# Patient Record
Sex: MALE | Race: WHITE | NOT HISPANIC OR LATINO | Employment: OTHER
[De-identification: names, ages, dates, MRNs, and addresses within clinical notes are randomized per-mention and may not be internally consistent; named-entity substitution may affect disease eponyms.]

---

## 2022-10-28 ENCOUNTER — TRANSCRIBE ORDERS (OUTPATIENT)
Dept: OTHER | Age: 62
End: 2022-10-28

## 2022-10-28 DIAGNOSIS — C61 PROSTATE CANCER (H): Primary | ICD-10-CM

## 2022-10-31 ENCOUNTER — PATIENT OUTREACH (OUTPATIENT)
Dept: ONCOLOGY | Facility: CLINIC | Age: 62
End: 2022-10-31

## 2022-10-31 NOTE — PROGRESS NOTES
New Patient Oncology Nurse Navigator Note     Referring provider:   Dr. Kobe Ontiveros at      Referred to (specialty): Medical Oncology    Date Referral Received: 10/28/22     Evaluation for : Second opinion Prostate Cancer      Clinical History (per Nurse review of records provided):   PAST MEDICAL HISTORY:   1) Prostate cancer  a. PSA 4.79 on 5/15/2013 having been normal at 1.89 6/15/2007  b. Subsequent increasing PSA with management deferred due to diagnosis of metastatic esophageal cancer.  c. PSA 12.92 on 6/2/2017  d. Prostate biopsy 5/1/2018 demonstrated adenocarcinoma Lind grade 4+4 (grade group 4) multiple subsequent discussions about treatment options occurred. Until ultimately proceeded to prostatectomy 9/1/2022. PSA was 37.84 on 1/27/2022  e. Prostatectomy 9/1/2022 pathologic evaluation demonstrated prostatic adenocarcinoma Jules 4.4 (grade group 4) 4 lymph nodes were removed noninvolved pT3a, pN0.  f. PSA on 9/29/2022 was 13.21  g. PSMA PET 10/6/2022 demonstrated abnormal bilateral pelvic lymph node uptake compatible with metastatic disease and abnormal uptake in the prostatectomy bed concerning for residual recurrent disease but could be inflammatory given prior anastomotic leak and surgery.    2)  Metastatic esophageal cancer  a. Presented 8/23/2013 iron deficiency anemia, hepatic metastases, portal vein thrombosis, gastric cardia thickening.  b. Diagnosed EGD 8/25/2013 GE junction biopsy demonstrate invasive adenocarcinoma border grade 3-4 (of 4). HER2 3+ immunohistochemistry  c. Metastatic disease diagnosed liver core needle biopsy 8/28/2013 demonstrating poorly differentiated non-small cell carcinoma.  d. Treated 5 cycles docetaxel, cisplatin, 5-FU, trastuzumab between 9/10/2013 and 12/10/2013 with excellent response. Continued single agent trastuzumab from January 2014 until 9/17/2015.  e. Ongoing surveillance CT imaging without evidence of progression through 9/10/2020          Records Location (Care Everywhere, Media, etc.):   [facility]     Records Needed:   Per protocol    I called Davion to discuss referral to medical oncology.  I reviewed what to expect at this first visit.  He indicates all care has been with .  I let him know as soon as his records are in process of retrieval, I will push on his referral to our new patient scheduling team.  They will be reaching out to set up the appointment.  At his request, I will be emailing both the new patient scheduling and [facility] phone numbers, as he is not able to write anything down at this time.  He does not have any questions at this time.  I let him know to call us if he does.

## 2022-11-01 ENCOUNTER — PRE VISIT (OUTPATIENT)
Dept: ONCOLOGY | Facility: CLINIC | Age: 62
End: 2022-11-01

## 2022-11-01 NOTE — TELEPHONE ENCOUNTER
Action 2022 1:12 PM ABT   Action Taken Slides from [facility] received and taken to 5th floor path lab for review.     Imaging Received  2022 6:27 PM ABT   Action: Images from [facility] received and resolved to PACS.     RECORDS STATUS - ALL OTHER DIAGNOSIS      RECORDS RECEIVED FROM: [facility]   DATE RECEIVED: 22   NOTES STATUS DETAILS   OFFICE NOTE from referring provider  10/26/22: Dr. Kobe Ontiveros   OFFICE NOTE from medical oncologist  10/26/22: Dr. Kobe Ontiveros   DISCHARGE SUMMARY from hospital  22, 13, 13: [facility]   DISCHARGE REPORT from the ER  22, 22, 22: [place] [facility]   OPERATIVE REPORT  22: robotic assisted radical prostatectomy with bilateral pelvic lymphadenectomy   MEDICATION LIST     LABS     PATHOLOGY REPORTS Re/--[facility]  [employer] 22: [id number]  18: [id number]   ANYTHING RELATED TO DIAGNOSIS  Most recent 22   IMAGING (NEED IMAGES & REPORT)     CT SCANS PACS 22: CT Pelvis  22-13: CT Chest Abd Pel  14-13: CT Abd Pel  13: CT Needle Biopsy   MRI PACS 22-19: MR Prostate  17: MR Pelvis   XRAYS PACS 22: XR Cystogram  22, 22: XR Abd   ULTRASOUND PACS 13: US Needle Bx   PET PACS 10/06/22: PET CT Skull  22-05/15/18: NM Bone Scan

## 2022-11-07 ENCOUNTER — ONCOLOGY VISIT (OUTPATIENT)
Dept: ONCOLOGY | Facility: CLINIC | Age: 62
End: 2022-11-07
Attending: INTERNAL MEDICINE
Payer: COMMERCIAL

## 2022-11-07 VITALS
HEART RATE: 72 BPM | TEMPERATURE: 98 F | SYSTOLIC BLOOD PRESSURE: 135 MMHG | OXYGEN SATURATION: 97 % | WEIGHT: 207.1 LBS | BODY MASS INDEX: 30.67 KG/M2 | DIASTOLIC BLOOD PRESSURE: 78 MMHG | HEIGHT: 69 IN

## 2022-11-07 DIAGNOSIS — C61 PROSTATE CANCER (H): ICD-10-CM

## 2022-11-07 PROCEDURE — 99205 OFFICE O/P NEW HI 60 MIN: CPT | Performed by: INTERNAL MEDICINE

## 2022-11-07 PROCEDURE — G0463 HOSPITAL OUTPT CLINIC VISIT: HCPCS

## 2022-11-07 RX ORDER — SILDENAFIL CITRATE 20 MG/1
20 TABLET ORAL
COMMUNITY
Start: 2022-09-30

## 2022-11-07 ASSESSMENT — PAIN SCALES - GENERAL: PAINLEVEL: NO PAIN (0)

## 2022-11-07 NOTE — PATIENT INSTRUCTIONS
Today we discussed the extent of disease as based on the postoperative PSA as well as the Pylarify PSMA PET scan.  From my read of the scans this appears to be oligometastatic prostate cancer which is confined to the pelvic lymph nodes.  There is some artifact of a fluid collection near the base of the bladder that may or may not contain some disease.    Because of the [place] 8 and the elevated PSA immediately after surgery I believe we should consider androgen deprivation therapy plus a next generation hormonal agent such as Zytiga in addition to pelvic radiotherapy.    Because continence is not yet fully restored I am comfortable waiting a few more weeks until approximately mid December when it will be 3 months from the surgery.  At that time we can consider starting the ADT and Zytiga and aim to do the radiation therapy in early 2023-2 to 3 months after starting the ADT.    We also discussed that there is a new clinical trial starting that looks that PLUVICTO  in the hormone sensitive setting.  I am going to screen him for eligibility for that trial however I think that the extent of disease may not be substantial enough to merit going only with systemic therapy.  In other words, I think there is a chance we can cure this disease by doing the combination of external beam radiation therapy to the prostate bed and the lymph nodes as well as the systemic therapy.    We described the side effects of the hormonal therapy to include hot flashes loss of bone density loss of libido weight gain and potential metabolic risks such as diabetes.  Much of this can be prevented through lifestyle changes such as diet and exercise.    I additionally described the side effects attributable to Zytiga which include hypertension, hypokalemia as well as edema.  This is typically taken care of with low-dose prednisone to reduce the side effects.  Alternative therapy could include darolutamide in the setting as well.    I would plan on  1 to 2 years of treatment with systemic hormonal therapy.  As I discussed with them the goal of therapy here is still cure.  Assuming that the lymph node is the bulk of the tumor in the body and that the systemic therapy is able to reduce and eliminate any systemic disease that is not visible on the PSMA PET scans.

## 2022-11-07 NOTE — NURSING NOTE
"Oncology Rooming Note    November 7, 2022 11:00 AM   Davion Yang is a 62 year old male who presents for:    Chief Complaint   Patient presents with     New Patient     PROSTATE CANCER      Initial Vitals: /78 (BP Location: Right arm, Patient Position: Sitting, Cuff Size: Adult Regular)   Pulse 72   Temp 98  F (36.7  C) (Oral)   Ht 1.759 m (5' 9.25\")   Wt 93.9 kg (207 lb 1.6 oz)   SpO2 97%   BMI 30.36 kg/m   Estimated body mass index is 30.36 kg/m  as calculated from the following:    Height as of this encounter: 1.759 m (5' 9.25\").    Weight as of this encounter: 93.9 kg (207 lb 1.6 oz). Body surface area is 2.14 meters squared.  No Pain (0) Comment: Data Unavailable   No LMP for male patient.  Allergies reviewed: Yes  Medications reviewed: Yes    Medications: Medication refills not needed today.  Pharmacy name entered into EPIC: Data Unavailable    Clinical concerns: New patient        Kendra Nguyen CMA            "

## 2022-11-07 NOTE — LETTER
2022         RE: Davion Yang  [address] [id number]        Dear Colleague,    Thank you for referring your patient, Davion Yang, to the [facility]. Please see a copy of my visit note below.      [facility] Medical Oncology New Patient Consultation Note       Date of visit: 2022    Ml: new diagnosis oligo met CSPC post RP    HPI:   Patient is a 61 y/o M with a history of prostate cancer and metastatic esophageal adenocarcinoma (2013).    2018: Prostate biopsy demonstrated adenocarcinoma [place] 8 (4+4).   2022: Prostatomy, pathologic evaluation demonstrated prostatic adenocarcinoma Jules 8 (4+4). 4 lymph nodes were removed and noninvolved pT3a, pN0  10/6/22: PSMA PET which demonstrated increased uptake in the prostate bed and bilateral pelvic lymph nodes.     PSA  6/15/2007 PSA 1.89  5/15/2013 PSA 4.79  2017 PSA 12.92  2019 PSA 20.77  2019 PSA 34.8  2020 PSA 34.63  2022 PSA 37.84  2022 PSA 13.21    Interval History:   Patient presents with his wife for a second opinion. Patient reports feeling well today. Patient has been complaining of erectile dysfunction since his prostatectomy in September. He notes there was anastomotic leak following the procedure. He noticed increased pressure, difficulty urinating, and difficulty emptying his bladder at that time. He received traction and noticed considerable improvement in his symptoms. He continues to have incontinence, using 1-2 pads a day. Denies any fevers, chills, headaches, chest pain, dyspnea, abdominal pain, N/V/D, or extremity swelling    PMH:  Prostate Cancer  Esophageal carcinoma ()    MEDS  slidenafil 20 mg     Social:  Occupation: , part-time   Former smoker, quit   Denies alcohol use  Denies Illicit drug use    Family   Father: , unknown cancer (Possible metastatic prostate)     ROS  10-point review of systems performed.  All negative except for symptoms noted above in HPI.    PHYSICAL EXAM    General: Patient appears well. Normal body habitus  HEENT: Normocephalic, atraumatic.   Cardiac: Regular rate and rhythm. No murmurs, rub, or extra heart sounds  Lungs: Clear to auscultation bilaterally  Abdomen: Normal bowel sounds. No tenderness to palpation    LABS AND IMAGES    10/6/2022 - PSMA PET CT  IMPRESSION:   1.   Abnormal radiotracer uptake within bilateral pelvic lymph nodes is compatible with metastatic disease.   2.  Abnormal radiotracer uptake at the site of prostatectomy and prior fluid collection is indeterminate. This is concerning for residual or recurrent disease, but the differential would include inflammatory findings related to the prior anastomotic leak and associated fluid collection.     Pathology 9-1-22    A.  Lymph nodes, right pelvic, regional resection:  - Three (3) lymph nodes identified, all negative for metastatic carcinoma (0/3).     B.  Lymph nodes, left pelvic, regional resection:  - One (1) lymph node identified, negative for metastatic carcinoma (0/1).     C.  Tip of left seminal vesicle, biopsy:  - Seminal vesicle tissue with no significant pathologic change.  - No malignancy identified.     D.  Bilateral ejaculatory ducts, biopsy:  - Ejaculatory duct tissue with no significant pathologic change.  - No malignancy identified.     E.  Prostate, radical prostatectomy:  - Prostatic adenocarcinoma, acinar type, with extensive cribriform growth.  (See comment)  - Histologic grade: Jules score 4+4 = 8 (grade group 4).  - Extraprostatic extension identified (nonfocal); no seminal vesicle invasion identified.  - Margins negative.  - Lymph nodes (pelvic):  0/4 (see Parts A & B above).  - AJCC pathologic stage:  pT3a pN0.    IMPRESSION AND PLAN  #Prostate adenocarcinoma s/p prostatectomy   Today we discussed the extent of disease as based on the postoperative PSA as well as the Pylarify PSMA PET scan.  From my  read of the scans this appears to be oligometastatic prostate cancer which is confined to the pelvic lymph nodes.  There is some artifact of a fluid collection near the base of the bladder that may or may not contain some disease.    Because of the [place] 8 and the elevated PSA immediately after surgery I believe we should consider androgen deprivation therapy plus a next generation hormonal agent such as Zytiga in addition to pelvic radiotherapy.    Because continence is not yet fully restored I am comfortable waiting a few more weeks until approximately mid December when it will be 3 months from the surgery.  At that time we can consider starting the ADT and Zytiga and aim to do the radiation therapy in early 2023-2 to 3 months after starting the ADT.    We also discussed that there is a new clinical trial starting that looks that PLUVICTO  in the hormone sensitive setting.  I am going to screen him for eligibility for that trial however I think that the extent of disease may not be substantial enough to merit going only with systemic therapy.  In other words, I think there is a chance we can cure this disease by doing the combination of external beam radiation therapy to the prostate bed and the lymph nodes as well as the systemic therapy.    We described the side effects of the hormonal therapy to include hot flashes loss of bone density loss of libido weight gain and potential metabolic risks such as diabetes.  Much of this can be prevented through lifestyle changes such as diet and exercise.    I additionally described the side effects attributable to Zytiga which include hypertension, hypokalemia as well as edema.  This is typically taken care of with low-dose prednisone to reduce the side effects.  Alternative therapy could include darolutamide in the setting as well.    I would plan on 1 to 2 years of treatment with systemic hormonal therapy.  As I discussed with them the goal of therapy here is still  cure.  Assuming that the lymph node is the bulk of the tumor in the body and that the systemic therapy is able to reduce and eliminate any systemic disease that is not visible on the PSMA PET scans.      SIGNED    Shane Dias MD  Professor of Medicine

## 2022-11-07 NOTE — PROGRESS NOTES
[facility] Medical Oncology New Patient Consultation Note       Date of visit: 2022    Ml: new diagnosis oligo met CSPC post RP    HPI:   Patient is a 61 y/o M with a history of prostate cancer and metastatic esophageal adenocarcinoma (2013).    2018: Prostate biopsy demonstrated adenocarcinoma [place] 8 (4+4).   2022: Prostatomy, pathologic evaluation demonstrated prostatic adenocarcinoma [place] 8 (4+4). 4 lymph nodes were removed and noninvolved pT3a, pN0  10/6/22: PSMA PET which demonstrated increased uptake in the prostate bed and bilateral pelvic lymph nodes.     PSA  6/15/2007 PSA 1.89  5/15/2013 PSA 4.79  2017 PSA 12.92  2019 PSA 20.77  2019 PSA 34.8  2020 PSA 34.63  2022 PSA 37.84  2022 PSA 13.21    Interval History:   Patient presents with his wife for a second opinion. Patient reports feeling well today. Patient has been complaining of erectile dysfunction since his prostatectomy in September. He notes there was anastomotic leak following the procedure. He noticed increased pressure, difficulty urinating, and difficulty emptying his bladder at that time. He received traction and noticed considerable improvement in his symptoms. He continues to have incontinence, using 1-2 pads a day. Denies any fevers, chills, headaches, chest pain, dyspnea, abdominal pain, N/V/D, or extremity swelling    PMH:  Prostate Cancer  Esophageal carcinoma ()    MEDS  slidenafil 20 mg     Social:  Occupation: , part-time   Former smoker, quit   Denies alcohol use  Denies Illicit drug use    Family   Father: , unknown cancer (Possible metastatic prostate)     ROS  10-point review of systems performed. All negative except for symptoms noted above in HPI.    PHYSICAL EXAM    General: Patient appears well. Normal body habitus  HEENT: Normocephalic, atraumatic.   Cardiac: Regular rate and rhythm. No murmurs, rub, or extra heart sounds  Lungs: Clear to  auscultation bilaterally  Abdomen: Normal bowel sounds. No tenderness to palpation    LABS AND IMAGES    10/6/2022 - PSMA PET CT  IMPRESSION:   1.   Abnormal radiotracer uptake within bilateral pelvic lymph nodes is compatible with metastatic disease.   2.  Abnormal radiotracer uptake at the site of prostatectomy and prior fluid collection is indeterminate. This is concerning for residual or recurrent disease, but the differential would include inflammatory findings related to the prior anastomotic leak and associated fluid collection.     Pathology 9-1-22    A.  Lymph nodes, right pelvic, regional resection:  - Three (3) lymph nodes identified, all negative for metastatic carcinoma (0/3).     B.  Lymph nodes, left pelvic, regional resection:  - One (1) lymph node identified, negative for metastatic carcinoma (0/1).     C.  Tip of left seminal vesicle, biopsy:  - Seminal vesicle tissue with no significant pathologic change.  - No malignancy identified.     D.  Bilateral ejaculatory ducts, biopsy:  - Ejaculatory duct tissue with no significant pathologic change.  - No malignancy identified.     E.  Prostate, radical prostatectomy:  - Prostatic adenocarcinoma, acinar type, with extensive cribriform growth.  (See comment)  - Histologic grade: Jules score 4+4 = 8 (grade group 4).  - Extraprostatic extension identified (nonfocal); no seminal vesicle invasion identified.  - Margins negative.  - Lymph nodes (pelvic):  0/4 (see Parts A & B above).  - AJCC pathologic stage:  pT3a pN0.    IMPRESSION AND PLAN  #Prostate adenocarcinoma s/p prostatectomy   Today we discussed the extent of disease as based on the postoperative PSA as well as the Pylarify PSMA PET scan.  From my read of the scans this appears to be oligometastatic prostate cancer which is confined to the pelvic lymph nodes.  There is some artifact of a fluid collection near the base of the bladder that may or may not contain some disease.    Because of the  [place] 8 and the elevated PSA immediately after surgery I believe we should consider androgen deprivation therapy plus a next generation hormonal agent such as Zytiga in addition to pelvic radiotherapy.    Because continence is not yet fully restored I am comfortable waiting a few more weeks until approximately mid December when it will be 3 months from the surgery.  At that time we can consider starting the ADT and Zytiga and aim to do the radiation therapy in early 2023-2 to 3 months after starting the ADT.    We also discussed that there is a new clinical trial starting that looks that PLUVICTO  in the hormone sensitive setting.  I am going to screen him for eligibility for that trial however I think that the extent of disease may not be substantial enough to merit going only with systemic therapy.  In other words, I think there is a chance we can cure this disease by doing the combination of external beam radiation therapy to the prostate bed and the lymph nodes as well as the systemic therapy.    We described the side effects of the hormonal therapy to include hot flashes loss of bone density loss of libido weight gain and potential metabolic risks such as diabetes.  Much of this can be prevented through lifestyle changes such as diet and exercise.    I additionally described the side effects attributable to Zytiga which include hypertension, hypokalemia as well as edema.  This is typically taken care of with low-dose prednisone to reduce the side effects.  Alternative therapy could include darolutamide in the setting as well.    I would plan on 1 to 2 years of treatment with systemic hormonal therapy.  As I discussed with them the goal of therapy here is still cure.  Assuming that the lymph node is the bulk of the tumor in the body and that the systemic therapy is able to reduce and eliminate any systemic disease that is not visible on the PSMA PET scans.      SIGNED    Shane Dias MD  Professor of  Medicine

## 2022-11-09 ENCOUNTER — PATIENT OUTREACH (OUTPATIENT)
Dept: ONCOLOGY | Facility: CLINIC | Age: 62
End: 2022-11-09

## 2022-11-09 ENCOUNTER — LAB REQUISITION (OUTPATIENT)
Dept: LAB | Facility: CLINIC | Age: 62
End: 2022-11-09
Payer: COMMERCIAL

## 2022-11-09 PROCEDURE — 88321 CONSLTJ&REPRT SLD PREP ELSWR: CPT | Performed by: PATHOLOGY

## 2022-11-09 NOTE — PROGRESS NOTES
[facility]: Cancer Care Plan of Care Education Note                                    Discussion with Patient:                                                      TC to pt to follow-up on visit with Dr. Dias on 11/07/2022    Assessment:                                                      Assessment completed with:: Patient;Spouse or significant other    Current living arrangement  I live in a private home with spouse    Plan of Care Education   Does patient understand diagnosis?: Yes  Does patient understand treatment plan/regimen?: Yes    Evaluation of Learning  Readiness:: Acceptance  Method:: Explanation  Response:: Verbalizes understanding    No assessment indicated    Intervention/Education provided during outreach:                                                       Pt and spouse deny questions and concerns at this time and stated pt plans on continuing care with Dr. Dias rather than locally in [place]. Pt will get radiation treatment when it's indicated in [place] but will continue with med onc care at [facility].     Provided initial My Chart login code to pt and encouraged them to sign up as soon as possible, and that writer will send them information via My Chart regarding treatment and all needed contact information for clinic. Explained that the scheduling team will be setting up pt's next appts and be in contact once completed.     Pt and spouse stated understanding of all and agreed to call clinic with any questions or concerns.     Patient to follow up at next appt - scheduling pending    Ping Witt, MSN, RN, OCN  RN Care Coordinator  [facility]

## 2022-11-11 LAB
PATH REPORT.COMMENTS IMP SPEC: ABNORMAL
PATH REPORT.COMMENTS IMP SPEC: ABNORMAL
PATH REPORT.COMMENTS IMP SPEC: YES
PATH REPORT.FINAL DX SPEC: ABNORMAL
PATH REPORT.GROSS SPEC: ABNORMAL
PATH REPORT.MICROSCOPIC SPEC OTHER STN: ABNORMAL
PATH REPORT.RELEVANT HX SPEC: ABNORMAL
PATH REPORT.RELEVANT HX SPEC: ABNORMAL
PATH REPORT.SITE OF ORIGIN SPEC: ABNORMAL

## 2022-12-26 ENCOUNTER — VIRTUAL VISIT (OUTPATIENT)
Dept: ONCOLOGY | Facility: CLINIC | Age: 62
End: 2022-12-26
Attending: INTERNAL MEDICINE
Payer: COMMERCIAL

## 2022-12-26 ENCOUNTER — DOCUMENTATION ONLY (OUTPATIENT)
Dept: ONCOLOGY | Facility: CLINIC | Age: 62
End: 2022-12-26

## 2022-12-26 VITALS — WEIGHT: 195 LBS | BODY MASS INDEX: 27.92 KG/M2 | HEIGHT: 70 IN

## 2022-12-26 DIAGNOSIS — C77.9 REGIONAL LYMPH NODE METASTASIS PRESENT (H): ICD-10-CM

## 2022-12-26 DIAGNOSIS — C61 PROSTATE CANCER (H): Primary | ICD-10-CM

## 2022-12-26 DIAGNOSIS — Z79.899 ENCOUNTER FOR LONG-TERM (CURRENT) USE OF MEDICATIONS: ICD-10-CM

## 2022-12-26 PROCEDURE — G0463 HOSPITAL OUTPT CLINIC VISIT: HCPCS | Mod: PN,GT | Performed by: INTERNAL MEDICINE

## 2022-12-26 PROCEDURE — 99214 OFFICE O/P EST MOD 30 MIN: CPT | Mod: 95 | Performed by: INTERNAL MEDICINE

## 2022-12-26 ASSESSMENT — PAIN SCALES - GENERAL: PAINLEVEL: NO PAIN (0)

## 2022-12-26 NOTE — PATIENT INSTRUCTIONS
During this follow-up visit today we were able to go over Davion's recovery from the prostatectomy.  He now has improved continence throughout most of the day.  He reports being dry most of the day he does have some mild stress incontinence with lifting coughing or laughing.  He has no other pains or problems.  He did see a radiation oncologist which suggested that he would be a excellent candidate for salvage radiation therapy.    Given the results of the PSMA PET which have shown metastatic disease in the pelvis I am going to prescribe ADT plus abiraterone for 2 years.  I also think he should undergo SBRT or other forms of radiation to the pelvic lymph nodes as well as the prostate bed which was poorly visualized last time.  I have suggested that he receive his shots through Dr. Dejesus's office while I can take care of the abiraterone prescription and monitoring.  I like him to follow-up with us in about 3 months time to evaluate his progress.  Our pharmacist will take care of Abiraterone insurance authorization and monitoring details.    CC Dr. Kendra Dejesus

## 2022-12-26 NOTE — LETTER
2022        RE: Davion Yang  [address] [facility] [address]        Davion is a 62 year old who is being evaluated via a billable video visit.      How would you like to obtain your AVS? MyChart  If the video visit is dropped, the invitation should be resent by: Send to e-mail at: [email or web address]  Will anyone else be joining your video visit?  Yes-wife will join     Avani Liu      Video-Visit Details    Type of service:  Video Visit   Video Start Time: 12:51 PM  Video End Time:1:32 PM    Originating Location (pt. Location): Home    Distant Location (provider location):  On-site  Platform used for Video Visit: [facility] Medical Oncology Followup Consultation Note       Date of visit: 2022    Ml: new diagnosis oligo met CSPC post RP    HPI:   Patient is a 61 y/o M with a history of prostate cancer and metastatic esophageal adenocarcinoma (2013).    2018: Prostate biopsy demonstrated adenocarcinoma [place] 8 (4+4).   2022: Prostatectomy, pathologic evaluation demonstrated prostatic adenocarcinoma Jules 8 (4+4). 4 lymph nodes were removed and noninvolved pT3a, pN0  10/6/22: PSMA PET which demonstrated increased uptake in the prostate bed and bilateral pelvic lymph nodes.     PSA  6/15/2007 PSA 1.89  5/15/2013 PSA 4.79  2017 PSA 12.92  2019 PSA 20.77  2019 PSA 34.8  2020 PSA 34.63  2022 PSA 37.84  2022 PSA 13.21    Interval History:   followup today. Continence nearly fully restored except occasional stress/urge incontinence. Dry most of the day. ED continues      PMH:  Prostate Cancer  Esophageal carcinoma ()    MEDS  slidenafil 20 mg     Social:  Occupation: , part-time   Former smoker, quit   Denies alcohol use  Denies Illicit drug use    Family   Father: , unknown cancer (Possible metastatic prostate)     ROS  10-point review of systems performed. All negative except for symptoms noted above in  HPI.    PHYSICAL EXAM    General: Patient appears well. Normal body habitus  HEENT: Normocephalic, atraumatic.   Cardiac: Regular rate and rhythm. No murmurs, rub, or extra heart sounds  Lungs: Clear to auscultation bilaterally  Abdomen: Normal bowel sounds. No tenderness to palpation    LABS AND IMAGES    10/6/2022 - PSMA PET CT  IMPRESSION:   1.   Abnormal radiotracer uptake within bilateral pelvic lymph nodes is compatible with metastatic disease.   2.  Abnormal radiotracer uptake at the site of prostatectomy and prior fluid collection is indeterminate. This is concerning for residual or recurrent disease, but the differential would include inflammatory findings related to the prior anastomotic leak and associated fluid collection.     Pathology 9-1-22    A.  Lymph nodes, right pelvic, regional resection:  - Three (3) lymph nodes identified, all negative for metastatic carcinoma (0/3).     B.  Lymph nodes, left pelvic, regional resection:  - One (1) lymph node identified, negative for metastatic carcinoma (0/1).     C.  Tip of left seminal vesicle, biopsy:  - Seminal vesicle tissue with no significant pathologic change.  - No malignancy identified.     D.  Bilateral ejaculatory ducts, biopsy:  - Ejaculatory duct tissue with no significant pathologic change.  - No malignancy identified.     E.  Prostate, radical prostatectomy:  - Prostatic adenocarcinoma, acinar type, with extensive cribriform growth.  (See comment)  - Histologic grade: Jules score 4+4 = 8 (grade group 4).  - Extraprostatic extension identified (nonfocal); no seminal vesicle invasion identified.  - Margins negative.  - Lymph nodes (pelvic):  0/4 (see Parts A & B above).  - AJCC pathologic stage:  pT3a pN0.    IMPRESSION AND PLAN  #Prostate adenocarcinoma s/p prostatectomy     During this follow-up visit today we were able to go over Davion's recovery from the prostatectomy.  He now has improved continence throughout most of the day.  He reports  "being dry most of the day he does have some mild stress incontinence with lifting coughing or laughing.  He has no other pains or problems.  He did see a radiation oncologist which suggested that he would be a excellent candidate for salvage radiation therapy.    Given the results of the PSMA PET which have shown metastatic disease in the pelvis I am going to prescribe ADT plus abiraterone for 2 years.  I also think he should undergo SBRT or other forms of radiation to the pelvic lymph nodes as well as the prostate bed which was poorly visualized last time.  I have suggested that he receive his shots through Dr. Dejesus's office while I can take care of the abiraterone prescription and monitoring.  I like him to follow-up with us in about 3 months time to evaluate his progress.  Our pharmacist will take care of Abiraterone insurance authorization and monitoring details.    --discussed \"Prostate 8\" and nutrition/exercise details  --discouraged ivermectin, per their questions about it.    35 min visit    CC Dr. Kendra Dejesus        SIGNED    Shane Cole of Medicine      "

## 2022-12-26 NOTE — LETTER
12/26/2022         RE: Davion Yang  [address] [facility] [address]        Dear Colleague,    Thank you for referring your patient, Davion Yang, to the [facility]. Please see a copy of my visit note below.    Davion is a 62 year old who is being evaluated via a billable video visit.      How would you like to obtain your AVS? MyChart  If the video visit is dropped, the invitation should be resent by: Send to e-mail at: [email or web address]  Will anyone else be joining your video visit?  Yes-wife will join     Avani Liu      Video-Visit Details    Type of service:  Video Visit   Video Start Time: 12:51 PM  Video End Time:1:32 PM    Originating Location (pt. Location): Home    Distant Location (provider location):  On-site  Platform used for Video Visit: [facility] Medical Oncology Followup Consultation Note       Date of visit: December 26, 2022    Ml: new diagnosis oligo met CSPC post RP    HPI:   Patient is a 63 y/o M with a history of prostate cancer and metastatic esophageal adenocarcinoma (8/2013).    5/1/2018: Prostate biopsy demonstrated adenocarcinoma [employer] 8 (4+4).   9/1/2022: Prostatectomy, pathologic evaluation demonstrated prostatic adenocarcinoma [employer] 8 (4+4). 4 lymph nodes were removed and noninvolved pT3a, pN0  10/6/22: PSMA PET which demonstrated increased uptake in the prostate bed and bilateral pelvic lymph nodes.     PSA  6/15/2007 PSA 1.89  5/15/2013 PSA 4.79  6/2/2017 PSA 12.92  5/02/2019 PSA 20.77  12/12/2019 PSA 34.8  7/16/2020 PSA 34.63  1/27/2022 PSA 37.84  9/29/2022 PSA 13.21    Interval History:   followup today. Continence nearly fully restored except occasional stress/urge incontinence. Dry most of the day. ED continues      PMH:  Prostate Cancer  Esophageal carcinoma (2013)    MEDS  slidenafil 20 mg     Social:  Occupation: , part-time   Former smoker, quit 1989  Denies alcohol use  Denies Illicit drug  use    Family   Father: , unknown cancer (Possible metastatic prostate)     ROS  10-point review of systems performed. All negative except for symptoms noted above in HPI.    PHYSICAL EXAM    General: Patient appears well. Normal body habitus  HEENT: Normocephalic, atraumatic.   Cardiac: Regular rate and rhythm. No murmurs, rub, or extra heart sounds  Lungs: Clear to auscultation bilaterally  Abdomen: Normal bowel sounds. No tenderness to palpation    LABS AND IMAGES    10/6/2022 - PSMA PET CT  IMPRESSION:   1.   Abnormal radiotracer uptake within bilateral pelvic lymph nodes is compatible with metastatic disease.   2.  Abnormal radiotracer uptake at the site of prostatectomy and prior fluid collection is indeterminate. This is concerning for residual or recurrent disease, but the differential would include inflammatory findings related to the prior anastomotic leak and associated fluid collection.     Pathology 22    A.  Lymph nodes, right pelvic, regional resection:  - Three (3) lymph nodes identified, all negative for metastatic carcinoma (0/3).     B.  Lymph nodes, left pelvic, regional resection:  - One (1) lymph node identified, negative for metastatic carcinoma (0/1).     C.  Tip of left seminal vesicle, biopsy:  - Seminal vesicle tissue with no significant pathologic change.  - No malignancy identified.     D.  Bilateral ejaculatory ducts, biopsy:  - Ejaculatory duct tissue with no significant pathologic change.  - No malignancy identified.     E.  Prostate, radical prostatectomy:  - Prostatic adenocarcinoma, acinar type, with extensive cribriform growth.  (See comment)  - Histologic grade: Florence score 4+4 = 8 (grade group 4).  - Extraprostatic extension identified (nonfocal); no seminal vesicle invasion identified.  - Margins negative.  - Lymph nodes (pelvic):  0/4 (see Parts A & B above).  - AJCC pathologic stage:  pT3a pN0.    IMPRESSION AND PLAN  #Prostate adenocarcinoma s/p prostatectomy  "    During this follow-up visit today we were able to go over Davion's recovery from the prostatectomy.  He now has improved continence throughout most of the day.  He reports being dry most of the day he does have some mild stress incontinence with lifting coughing or laughing.  He has no other pains or problems.  He did see a radiation oncologist which suggested that he would be a excellent candidate for salvage radiation therapy.    Given the results of the PSMA PET which have shown metastatic disease in the pelvis I am going to prescribe ADT plus abiraterone for 2 years.  I also think he should undergo SBRT or other forms of radiation to the pelvic lymph nodes as well as the prostate bed which was poorly visualized last time.  I have suggested that he receive his shots through Dr. Dejesus's office while I can take care of the abiraterone prescription and monitoring.  I like him to follow-up with us in about 3 months time to evaluate his progress.  Our pharmacist will take care of Abiraterone insurance authorization and monitoring details.    --discussed \"Prostate 8\" and nutrition/exercise details  --discouraged ivermectin, per their questions about it.    35 min visit    CC Dr. Kendra Dejesus        SIGNED    Shane Dias MD  Professor of Medicine        Again, thank you for allowing me to participate in the care of your patient.        Sincerely,        Shane Dias MD    "

## 2022-12-26 NOTE — LETTER
Date:December 27, 2022      Provider requested that no letter be sent. Do not send.       [facility]

## 2022-12-26 NOTE — Clinical Note
December 26, 2022       TO: Daivon Yang  [address] [facility] [place] [address]       DearMr.Celia,    We are writing to inform you of your test results.    {ump results letter list:[id number]}    No results found from the In Basket message.    ***

## 2022-12-26 NOTE — PROGRESS NOTES
Davion is a 62 year old who is being evaluated via a billable video visit.      How would you like to obtain your AVS? MyChart  If the video visit is dropped, the invitation should be resent by: Send to e-mail at: [email or web address]  Will anyone else be joining your video visit?  Yes-wife will join     Avani Liu      Video-Visit Details    Type of service:  Video Visit   Video Start Time: 12:51 PM  Video End Time:1:32 PM    Originating Location (pt. Location): Home    Distant Location (provider location):  On-site  Platform used for Video Visit: [facility] Medical Oncology Followup Consultation Note       Date of visit: 2022    Ml: new diagnosis oligo met CSPC post RP    HPI:   Patient is a 61 y/o M with a history of prostate cancer and metastatic esophageal adenocarcinoma (2013).    2018: Prostate biopsy demonstrated adenocarcinoma [place] 8 (4+4).   2022: Prostatectomy, pathologic evaluation demonstrated prostatic adenocarcinoma Jules 8 (4+4). 4 lymph nodes were removed and noninvolved pT3a, pN0  10/6/22: PSMA PET which demonstrated increased uptake in the prostate bed and bilateral pelvic lymph nodes.     PSA  6/15/2007 PSA 1.89  5/15/2013 PSA 4.79  2017 PSA 12.92  2019 PSA 20.77  2019 PSA 34.8  2020 PSA 34.63  2022 PSA 37.84  2022 PSA 13.21    Interval History:   followup today. Continence nearly fully restored except occasional stress/urge incontinence. Dry most of the day. ED continues      PMH:  Prostate Cancer  Esophageal carcinoma ()    MEDS  slidenafil 20 mg     Social:  Occupation: , part-time   Former smoker, quit   Denies alcohol use  Denies Illicit drug use    Family   Father: , unknown cancer (Possible metastatic prostate)     ROS  10-point review of systems performed. All negative except for symptoms noted above in HPI.    PHYSICAL EXAM    General: Patient appears well. Normal body habitus  HEENT:  Normocephalic, atraumatic.   Cardiac: Regular rate and rhythm. No murmurs, rub, or extra heart sounds  Lungs: Clear to auscultation bilaterally  Abdomen: Normal bowel sounds. No tenderness to palpation    LABS AND IMAGES    10/6/2022 - PSMA PET CT  IMPRESSION:   1.   Abnormal radiotracer uptake within bilateral pelvic lymph nodes is compatible with metastatic disease.   2.  Abnormal radiotracer uptake at the site of prostatectomy and prior fluid collection is indeterminate. This is concerning for residual or recurrent disease, but the differential would include inflammatory findings related to the prior anastomotic leak and associated fluid collection.     Pathology 9-1-22    A.  Lymph nodes, right pelvic, regional resection:  - Three (3) lymph nodes identified, all negative for metastatic carcinoma (0/3).     B.  Lymph nodes, left pelvic, regional resection:  - One (1) lymph node identified, negative for metastatic carcinoma (0/1).     C.  Tip of left seminal vesicle, biopsy:  - Seminal vesicle tissue with no significant pathologic change.  - No malignancy identified.     D.  Bilateral ejaculatory ducts, biopsy:  - Ejaculatory duct tissue with no significant pathologic change.  - No malignancy identified.     E.  Prostate, radical prostatectomy:  - Prostatic adenocarcinoma, acinar type, with extensive cribriform growth.  (See comment)  - Histologic grade: Jennings score 4+4 = 8 (grade group 4).  - Extraprostatic extension identified (nonfocal); no seminal vesicle invasion identified.  - Margins negative.  - Lymph nodes (pelvic):  0/4 (see Parts A & B above).  - AJCC pathologic stage:  pT3a pN0.    IMPRESSION AND PLAN  #Prostate adenocarcinoma s/p prostatectomy     During this follow-up visit today we were able to go over Davion's recovery from the prostatectomy.  He now has improved continence throughout most of the day.  He reports being dry most of the day he does have some mild stress incontinence with lifting  "coughing or laughing.  He has no other pains or problems.  He did see a radiation oncologist which suggested that he would be a excellent candidate for salvage radiation therapy.    Given the results of the PSMA PET which have shown metastatic disease in the pelvis I am going to prescribe ADT plus abiraterone for 2 years.  I also think he should undergo SBRT or other forms of radiation to the pelvic lymph nodes as well as the prostate bed which was poorly visualized last time.  I have suggested that he receive his shots through Dr. Dejesus's office while I can take care of the abiraterone prescription and monitoring.  I like him to follow-up with us in about 3 months time to evaluate his progress.  Our pharmacist will take care of Abiraterone insurance authorization and monitoring details.    --discussed \"Prostate 8\" and nutrition/exercise details  --discouraged ivermectin, per their questions about it.    35 min visit    CC Dr. Kendra Dejesus        SIGNED    Shane Cole of Medicine    "

## 2022-12-27 ENCOUNTER — TELEPHONE (OUTPATIENT)
Dept: ONCOLOGY | Facility: CLINIC | Age: 62
End: 2022-12-27

## 2022-12-27 NOTE — TELEPHONE ENCOUNTER
PA Initiation    Medication: Abiraterone - Submitted  Insurance Company: [employer] - Phone [phone] Fax [phone]  Pharmacy Filling the Rx:    Filling Pharmacy Phone:    Filling Pharmacy Fax:    Start Date: 12/27/2022        Ghislaine Israel CPhTOncology Pharmacy [email or web address] [address] [address]  Office: [phone]  Fax: [phone]  [email or web address]

## 2022-12-28 ENCOUNTER — TELEPHONE (OUTPATIENT)
Dept: ONCOLOGY | Facility: CLINIC | Age: 62
End: 2022-12-28

## 2022-12-28 NOTE — TELEPHONE ENCOUNTER
Prior Authorization Approval    Authorization Effective Date: 12/28/2022  Authorization Expiration Date: 12/28/2023  Medication: Abiraterone - APPROVED  Approved Dose/Quantity:   Reference #:     Insurance Company: [employer] - Phone [phone] Fax [phone]  Expected CoPay:       CoPay Card Available:      Foundation Assistance Needed:    Which Pharmacy is filling the prescription (Not needed for infusion/clinic administered):    Pharmacy Notified:    Patient Notified:          Ghislaine Israel CPhTOncology Pharmacy Liaison     [facility] [place] [address]  Office: [phone]  Fax: [phone]  [email or web address]

## 2023-01-04 DIAGNOSIS — C61 PROSTATE CANCER (H): Primary | ICD-10-CM

## 2023-01-04 DIAGNOSIS — C77.9 REGIONAL LYMPH NODE METASTASIS PRESENT (H): ICD-10-CM

## 2023-01-04 RX ORDER — PREDNISONE 5 MG/1
5 TABLET ORAL 2 TIMES DAILY
Qty: 60 TABLET | Refills: 0 | Status: SHIPPED | OUTPATIENT
Start: 2023-01-04 | End: 2023-02-27

## 2023-01-04 RX ORDER — ABIRATERONE ACETATE 250 MG/1
1000 TABLET ORAL DAILY
Qty: 120 TABLET | Refills: 0 | Status: SHIPPED | OUTPATIENT
Start: 2023-01-04 | End: 2023-02-03

## 2023-01-13 ENCOUNTER — TELEPHONE (OUTPATIENT)
Dept: ONCOLOGY | Facility: CLINIC | Age: 63
End: 2023-01-13

## 2023-01-13 NOTE — TELEPHONE ENCOUNTER
Oral Chemotherapy Monitoring Program    Placed call to patient in follow up of abiraterone therapy.    Left message to please call back in follow up of therapy. No patient or drug names were mentioned.    Rom Cancino, Pharmacy Intern  Oral Chemotherapy Monitoring Program  [phone]

## 2023-01-23 DIAGNOSIS — C61 PROSTATE CANCER (H): Primary | ICD-10-CM

## 2023-01-23 DIAGNOSIS — C77.9 REGIONAL LYMPH NODE METASTASIS PRESENT (H): ICD-10-CM

## 2023-01-24 ENCOUNTER — TELEPHONE (OUTPATIENT)
Dept: ONCOLOGY | Facility: CLINIC | Age: 63
End: 2023-01-24
Payer: COMMERCIAL

## 2023-01-24 NOTE — TELEPHONE ENCOUNTER
"Oral Chemotherapy Monitoring Program    Subjective/Objective:  Davion Yang is a 62 year old male contacted by phone for a follow-up visit for oral chemotherapy. Davion confirms taking the appropriate dose of Zytiga 1000mg (4x 250mg tablet) daily and shares that he started therapy on 1/18/23. He reports that he hasn't noticed any side effects since starting. He denies any missed doses, medication changes or recent hospital or ED visits.     ORAL CHEMOTHERAPY 12/26/2022 12/28/2022 1/4/2023 1/13/2023 1/24/2023   Assessment Type Initial Work up New Teach Lab Monitoring;Refill Left Voicemail Initial Follow up   Diagnosis Code Prostate Cancer Prostate Cancer Prostate Cancer Prostate Cancer Prostate Cancer   Providers Dr. Arun Dias   [facility]   Drug Name Zytiga (abiraterone) Zytiga (abiraterone) Zytiga (abiraterone) Zytiga (abiraterone) Zytiga (abiraterone)   Dose 1,000 mg 1,000 mg 1,000 mg 1,000 mg 1,000 mg   Current Schedule Daily Daily Daily Daily Daily   Cycle Details Continuous Continuous Continuous Continuous Continuous   Start Date of Last Cycle - - - - 1/18/2023   Doses missed in last 2 weeks - - - - 0   Adherence Assessment - - - - Adherent   Adverse Effects - - - - No AE identified during assessment   Any new drug interactions? No - - - No   Is the dose as ordered appropriate for the patient? Yes - - - Yes   Since the last time we talked, have you been hospitalized or used the emergency room? - - - - No       Last PHQ-2 Score on record: No flowsheet data found.    Vitals:  BP:   BP Readings from Last 1 Encounters:   11/07/22 135/78     Wt Readings from Last 1 Encounters:   12/26/22 88.5 kg (195 lb)     Estimated body surface area is 2.09 meters squared as calculated from the following:    Height as of 12/26/22: 1.778 m (5' 10\").    Weight as of 12/26/22: 88.5 kg (195 lb).    Labs:  No results found for NA within last 30 days. "     No results found for K within last 30 days.     No results found for CA within last 30 days.     No results found for Mag within last 30 days.     No results found for Phos within last 30 days.     No results found for ALBUMIN within last 30 days.     No results found for BUN within last 30 days.     No results found for CR within last 30 days.     No results found for AST within last 30 days.     No results found for ALT within last 30 days.     No results found for BILITOTAL within last 30 days.     No results found for WBC within last 30 days.     No results found for HGB within last 30 days.     No results found for PLT within last 30 days.     No results found for ANC within last 30 days.     No results found for ANC within last 30 days.        Assessment/Plan:  Davion is tolerating the start of therapy well. Discussed need for every 2 week lab checks of his CMP locally [facility] the [facility]. He will schedule something for next week. Plan to continue Zytiga therapy as scheduled     Follow-Up:  Next week for labs    Refill Due:  2/10 for 2/17      Katy Davila, PharmD, BCACP  Hematology/Oncology Clinical Pharmacist  Oral Chemotherapy Monitoring Program  [facility]  [phone]

## 2023-01-31 ENCOUNTER — DOCUMENTATION ONLY (OUTPATIENT)
Dept: ONCOLOGY | Facility: CLINIC | Age: 63
End: 2023-01-31
Payer: COMMERCIAL

## 2023-01-31 NOTE — PROGRESS NOTES
Oral Chemotherapy Monitoring Program  Lab Follow Up    Reviewed lab results from 1/26/23.    ORAL CHEMOTHERAPY 12/26/2022 12/28/2022 1/4/2023 1/13/2023 1/24/2023 1/31/2023   Assessment Type Initial Work up New Teach Lab Monitoring;Refill Left Voicemail Initial Follow up Lab Monitoring   Diagnosis Code Prostate Cancer Prostate Cancer Prostate Cancer Prostate Cancer Prostate Cancer Prostate Cancer   Providers Dr. Arun Dias   Clinic Name/Location [facility]   Drug Name Zytiga (abiraterone) Zytiga (abiraterone) Zytiga (abiraterone) Zytiga (abiraterone) Zytiga (abiraterone) Zytiga (abiraterone)   Dose 1,000 mg 1,000 mg 1,000 mg 1,000 mg 1,000 mg 1,000 mg   Current Schedule Daily Daily Daily Daily Daily Daily   Cycle Details Continuous Continuous Continuous Continuous Continuous Continuous   Start Date of Last Cycle - - - - 1/18/2023 -   Doses missed in last 2 weeks - - - - 0 -   Adherence Assessment - - - - Adherent -   Adverse Effects - - - - No AE identified during assessment -   Any new drug interactions? No - - - No -   Is the dose as ordered appropriate for the patient? Yes - - - Yes -   Since the last time we talked, have you been hospitalized or used the emergency room? - - - - No -       Labs:      Assessment & Plan:  No concerning abnormalities.  [employer] message sent to patient to continue current regimen.    Follow-Up:  Due for labs in 2 weeks so around 2/9/23    No Reid PharmD  Oral Chemotherapy Monitoring Program  [facility]  [phone]

## 2023-02-10 DIAGNOSIS — C77.9 REGIONAL LYMPH NODE METASTASIS PRESENT (H): ICD-10-CM

## 2023-02-10 DIAGNOSIS — C61 PROSTATE CANCER (H): Primary | ICD-10-CM

## 2023-02-10 RX ORDER — PREDNISONE 5 MG/1
5 TABLET ORAL 2 TIMES DAILY
Qty: 60 TABLET | Refills: 0 | Status: SHIPPED | OUTPATIENT
Start: 2023-02-10 | End: 2023-09-18

## 2023-02-10 RX ORDER — ABIRATERONE ACETATE 250 MG/1
1000 TABLET ORAL DAILY
Qty: 120 TABLET | Refills: 0 | Status: SHIPPED | OUTPATIENT
Start: 2023-02-10 | End: 2023-03-12

## 2023-02-10 NOTE — ORAL ONC MGMT
Oral Chemotherapy Monitoring Program    Placed call in follow up of labs. Davion was already at the clinic today for the radiation appointment but will get labs scheduled Monday 2/13 when he is at the clinic. Released abiraterone refill to [facility] so there is no delay in delivery due to [employer] shipment delays.     Follow up:  2/13: q2week CMP labs at local [facility] in [place]     Jagdeep Hartman, PharmD  Hematology/Oncology Clinical Pharmacist  [facility]  [facility]

## 2023-02-12 ENCOUNTER — HEALTH MAINTENANCE LETTER (OUTPATIENT)
Age: 63
End: 2023-02-12

## 2023-02-15 ENCOUNTER — TELEPHONE (OUTPATIENT)
Dept: ONCOLOGY | Facility: CLINIC | Age: 63
End: 2023-02-15
Payer: COMMERCIAL

## 2023-02-15 NOTE — ORAL ONC MGMT
Oral Chemotherapy Monitoring Program  Lab Follow Up    Reviewed lab results.    ORAL CHEMOTHERAPY 1/4/2023 1/13/2023 1/24/2023 1/31/2023 2/10/2023 2/10/2023 2/15/2023   Assessment Type Lab Monitoring;Refill Left Voicemail Initial Follow up Lab Monitoring Refill Other Lab Monitoring   Diagnosis Code Prostate Cancer Prostate Cancer Prostate Cancer Prostate Cancer Prostate Cancer Prostate Cancer Prostate Cancer   Providers Dr. Arun Dias   [facility]   Drug Name Zytiga (abiraterone) Zytiga (abiraterone) Zytiga (abiraterone) Zytiga (abiraterone) Zytiga (abiraterone) Zytiga (abiraterone) Zytiga (abiraterone)   Dose 1,000 mg 1,000 mg 1,000 mg 1,000 mg 1,000 mg 1,000 mg -   Current Schedule Daily Daily Daily Daily Daily Daily -   Cycle Details Continuous Continuous Continuous Continuous Continuous Continuous -   Start Date of Last Cycle - - 1/18/2023 - - - -   Doses missed in last 2 weeks - - 0 - - - -   Adherence Assessment - - Adherent - - - -   Adverse Effects - - No AE identified during assessment - - - -   Any new drug interactions? - - No - - - -   Is the dose as ordered appropriate for the patient? - - Yes - - - -   Since the last time we talked, have you been hospitalized or used the emergency room? - - No - - - -       Labs:  No results found for NA within last 30 days.     No results found for K within last 30 days.     No results found for CA within last 30 days.     No results found for Mag within last 30 days.     No results found for Phos within last 30 days.     No results found for ALBUMIN within last 30 days.     No results found for BUN within last 30 days.     No results found for CR within last 30 days.     No results found for AST within last 30 days.     No results found for ALT within last 30 days.     No results found for BILITOTAL within last 30 days.     No results found for WBC within  last 30 days.     No results found for HGB within last 30 days.     No results found for PLT within last 30 days.     No results found for ANC within last 30 days.     No results found for ANC within last 30 days.        Assessment & Plan:  Zytiga labs are stable. Continue Zytiga and recheck in two weeks.    Follow-Up:  Davion is planning to recheck labs at his local clinic on 2/27/2023    Emmanuel Hagen PharmD  [facility]  [phone]  February 15, 2023

## 2023-02-27 ENCOUNTER — TELEPHONE (OUTPATIENT)
Dept: ONCOLOGY | Facility: CLINIC | Age: 63
End: 2023-02-27
Payer: COMMERCIAL

## 2023-02-27 RX ORDER — CHOLECALCIFEROL (VITAMIN D3) 50 MCG
1 TABLET ORAL DAILY
COMMUNITY
Start: 2023-02-27

## 2023-02-27 NOTE — ORAL ONC MGMT
Oral Chemotherapy Monitoring Program    Subjective/Objective:  Davion Yang is a 62 year old male contacted by phone for a follow-up visit for oral chemotherapy.  Significant other Gi joined us.  Tolerating well, however has been experiencing A LOT of hot flashes that occur at random.  Middle of the night, whenever.  He says they are tolerable but inconvenient.  Patient also has some fatigue - feels a little tired.  He takes a nap in the middle of the day that lasts anywhere between 15 minutes - 1 hour.  Still takes walks, and able to be active.  Again, he states the fatigue is tolerable.      Patient is adherent.  He recently started Vitamin D3 2000 IU daily and is drinking 100% Aloe Juice.    Patient and SO spoke highly of [facility].  They are expecting the next delivery of Zytiga today.    ORAL CHEMOTHERAPY 1/13/2023 1/24/2023 1/31/2023 2/10/2023 2/10/2023 2/15/2023 2/27/2023   Assessment Type Left Voicemail Initial Follow up Lab Monitoring Refill Other Lab Monitoring Lab Monitoring;Monthly Follow up   Diagnosis Code Prostate Cancer Prostate Cancer Prostate Cancer Prostate Cancer Prostate Cancer Prostate Cancer Prostate Cancer   Providers Dr. Arun Dias   [facility]   Drug Name Zytiga (abiraterone) Zytiga (abiraterone) Zytiga (abiraterone) Zytiga (abiraterone) Zytiga (abiraterone) Zytiga (abiraterone) Zytiga (abiraterone)   Dose 1,000 mg 1,000 mg 1,000 mg 1,000 mg 1,000 mg - 1,000 mg   Current Schedule Daily Daily Daily Daily Daily - Daily   Cycle Details Continuous Continuous Continuous Continuous Continuous - Continuous   Start Date of Last Cycle - 1/18/2023 - - - - -   Doses missed in last 2 weeks - 0 - - - - 0   Adherence Assessment - Adherent - - - - Adherent   Adverse Effects - No AE identified during assessment - - - - Fatigue   Fatigue - - - - - - Grade 1   Pharmacist  "Intervention(fatigue) - - - - - - Yes   Intervention(s) - - - - - - Patient education   Home BPs - - - - - - not done   Any new drug interactions? - No - - - - -   Is the dose as ordered appropriate for the patient? - Yes - - - - Yes   Is the patient currently in pain? - - - - - - No   Has the patient missed any days of school, work, or other routine activity? - - - - - - Yes   Days missed in the past month: - - - - - - 5 days or less   Since the last time we talked, have you been hospitalized or used the emergency room? - No - - - - No     Vitals:  BP:   BP Readings from Last 1 Encounters:   11/07/22 135/78     Wt Readings from Last 1 Encounters:   12/26/22 88.5 kg (195 lb)     Estimated body surface area is 2.09 meters squared as calculated from the following:    Height as of 12/26/22: 1.778 m (5' 10\").    Weight as of 12/26/22: 88.5 kg (195 lb).    Labs:  From today at 7:41am (see CareEverywhere)      Assessment/Plan:  Patient tolerating well (enough).  He will continue current regimen.  No other questions came up.      Follow-Up:  3/13 for biweekly labs    Refill Due:  3/10    No Reid PharmD  Oral Chemotherapy Monitoring Program  [facility]  [phone]        "

## 2023-03-07 DIAGNOSIS — C77.9 REGIONAL LYMPH NODE METASTASIS PRESENT (H): ICD-10-CM

## 2023-03-07 DIAGNOSIS — C61 PROSTATE CANCER (H): Primary | ICD-10-CM

## 2023-03-13 ENCOUNTER — MYC MEDICAL ADVICE (OUTPATIENT)
Dept: ONCOLOGY | Facility: CLINIC | Age: 63
End: 2023-03-13
Payer: COMMERCIAL

## 2023-03-13 NOTE — TELEPHONE ENCOUNTER
Oral Chemotherapy Monitoring Program  Lab Follow Up    Reviewed lab results from today.    ORAL CHEMOTHERAPY 1/24/2023 1/31/2023 2/10/2023 2/10/2023 2/15/2023 2/27/2023 3/13/2023   Assessment Type Initial Follow up Lab Monitoring Refill Other Lab Monitoring Lab Monitoring;Monthly Follow up Lab Monitoring   Diagnosis Code Prostate Cancer Prostate Cancer Prostate Cancer Prostate Cancer Prostate Cancer Prostate Cancer Prostate Cancer   Providers Dr. Arun Dias   [facility]   Drug Name Zytiga (abiraterone) Zytiga (abiraterone) Zytiga (abiraterone) Zytiga (abiraterone) Zytiga (abiraterone) Zytiga (abiraterone) Zytiga (abiraterone)   Dose 1,000 mg 1,000 mg 1,000 mg 1,000 mg - 1,000 mg 1,000 mg   Current Schedule Daily Daily Daily Daily - Daily Daily   Cycle Details Continuous Continuous Continuous Continuous - Continuous Continuous   Start Date of Last Cycle 1/18/2023 - - - - - -   Doses missed in last 2 weeks 0 - - - - 0 -   Adherence Assessment Adherent - - - - Adherent -   Adverse Effects No AE identified during assessment - - - - Fatigue -   Fatigue - - - - - Grade 1 -   Pharmacist Intervention(fatigue) - - - - - Yes -   Intervention(s) - - - - - Patient education -   Home BPs - - - - - not done -   Any new drug interactions? No - - - - - -   Is the dose as ordered appropriate for the patient? Yes - - - - Yes -   Is the patient currently in pain? - - - - - No -   Has the patient missed any days of school, work, or other routine activity? - - - - - Yes -   Days missed in the past month: - - - - - 5 days or less -   Since the last time we talked, have you been hospitalized or used the emergency room? No - - - - No -       Labs:  No results found for NA within last 30 days.     No results found for K within last 30 days.     No results found for CA within last 30 days.     No results found for Mag within last  30 days.     No results found for Phos within last 30 days.     No results found for ALBUMIN within last 30 days.     No results found for BUN within last 30 days.     No results found for CR within last 30 days.     No results found for AST within last 30 days.     No results found for ALT within last 30 days.     No results found for BILITOTAL within last 30 days.     No results found for WBC within last 30 days.     No results found for HGB within last 30 days.     No results found for PLT within last 30 days.     No results found for ANC within last 30 days.     No results found for ANC within last 30 days.        Assessment & Plan:  Results show no concerning abnormalities. MyChart sent to the patient on the results. Continue Zytiga therapy as planned.     Follow-Up:  3/27: appt with Dr. Arun Davila, PharmD, BCACP  Hematology/Oncology Clinical Pharmacist  Oral Chemotherapy Monitoring Program  [facility]  [phone]

## 2023-03-17 DIAGNOSIS — C77.9 REGIONAL LYMPH NODE METASTASIS PRESENT (H): ICD-10-CM

## 2023-03-17 DIAGNOSIS — C61 PROSTATE CANCER (H): Primary | ICD-10-CM

## 2023-03-17 RX ORDER — ABIRATERONE ACETATE 250 MG/1
1000 TABLET ORAL DAILY
Qty: 120 TABLET | Refills: 0 | Status: SHIPPED | OUTPATIENT
Start: 2023-03-17 | End: 2023-04-16

## 2023-03-17 RX ORDER — PREDNISONE 5 MG/1
5 TABLET ORAL 2 TIMES DAILY
Qty: 60 TABLET | Refills: 0 | Status: SHIPPED | OUTPATIENT
Start: 2023-03-17 | End: 2023-09-18

## 2023-03-27 ENCOUNTER — VIRTUAL VISIT (OUTPATIENT)
Dept: ONCOLOGY | Facility: CLINIC | Age: 63
End: 2023-03-27
Attending: INTERNAL MEDICINE
Payer: COMMERCIAL

## 2023-03-27 DIAGNOSIS — C61 PROSTATE CANCER (H): Primary | ICD-10-CM

## 2023-03-27 PROCEDURE — G0463 HOSPITAL OUTPT CLINIC VISIT: HCPCS | Mod: PN,GT | Performed by: INTERNAL MEDICINE

## 2023-03-27 PROCEDURE — 99214 OFFICE O/P EST MOD 30 MIN: CPT | Mod: VID | Performed by: INTERNAL MEDICINE

## 2023-03-27 NOTE — PROGRESS NOTES
Virtual Visit Details    Type of service:  Video Visit   Video Start Time: 1:08 PM  Video End Time:1:22 PM    Originating Location (pt. Location): Home    Distant Location (provider location):  On-site  Platform used for Video Visit: [facility] Medical Oncology Followup Consultation Note       Date of visit: 2023    Ml: new diagnosis oligo met CSPC post RP    HPI:   Patient is a 61 y/o M with a history of prostate cancer and metastatic esophageal adenocarcinoma (2013).    2018: Prostate biopsy demonstrated adenocarcinoma [place] 8 (4+4).   2022: Prostatectomy, pathologic evaluation demonstrated prostatic adenocarcinoma [place] 8 (4+4). 4 lymph nodes were removed and noninvolved pT3a, pN0  10/6/22: PSMA PET which demonstrated increased uptake in the prostate bed and bilateral pelvic lymph nodes.     PSA  6/15/2007 PSA 1.89  5/15/2013 PSA 4.79  2017 PSA 12.92  2019 PSA 20.77  2019 PSA 34.8  2020 PSA 34.63  2022 PSA 37.84  2022 PSA 13.21  2023 START LUPRON + Zytiga  23  PSA = 6.49  ---Radiation STart  3/13/23 PSA = 1.7  3/24/23 Radiation Complete.       Interval History:   Blood pressure ok  Urinary control is ok  Tolerating the Zytiga Pred well.         PMH:  Prostate Cancer  Esophageal carcinoma ()    MEDS  Current Outpatient Medications   Medication Instructions     abiraterone (ZYTIGA) 1,000 mg, Oral, DAILY, Take on empty stomach.     Aloe LIQD Oral, DAILY     predniSONE (DELTASONE) 5 mg, Oral, 2 TIMES DAILY     predniSONE (DELTASONE) 5 mg, Oral, 2 TIMES DAILY     sildenafil (REVATIO) 20 mg     vitamin D3 (CHOLECALCIFEROL) 50 mcg (2000 units) tablet 1 tablet, Oral, DAILY         Social:  Occupation: , part-time   Former smoker, quit   Denies alcohol use  Denies Illicit drug use    Family   Father: , unknown cancer (Possible metastatic prostate)     ROS  10-point review of systems performed. All negative except for  symptoms noted above in HPI.    PHYSICAL EXAM    General: Patient appears well. Normal body habitus  HEENT: Normocephalic, atraumatic.   Cardiac: Regular rate and rhythm. No murmurs, rub, or extra heart sounds  Lungs: Clear to auscultation bilaterally  Abdomen: Normal bowel sounds. No tenderness to palpation    LABS AND IMAGES    10/6/2022 - PSMA PET CT  IMPRESSION:   1.   Abnormal radiotracer uptake within bilateral pelvic lymph nodes is compatible with metastatic disease.   2.  Abnormal radiotracer uptake at the site of prostatectomy and prior fluid collection is indeterminate. This is concerning for residual or recurrent disease, but the differential would include inflammatory findings related to the prior anastomotic leak and associated fluid collection.     Pathology 9-1-22    A.  Lymph nodes, right pelvic, regional resection:  - Three (3) lymph nodes identified, all negative for metastatic carcinoma (0/3).     B.  Lymph nodes, left pelvic, regional resection:  - One (1) lymph node identified, negative for metastatic carcinoma (0/1).     C.  Tip of left seminal vesicle, biopsy:  - Seminal vesicle tissue with no significant pathologic change.  - No malignancy identified.     D.  Bilateral ejaculatory ducts, biopsy:  - Ejaculatory duct tissue with no significant pathologic change.  - No malignancy identified.     E.  Prostate, radical prostatectomy:  - Prostatic adenocarcinoma, acinar type, with extensive cribriform growth.  (See comment)  - Histologic grade: Stanfield score 4+4 = 8 (grade group 4).  - Extraprostatic extension identified (nonfocal); no seminal vesicle invasion identified.  - Margins negative.  - Lymph nodes (pelvic):  0/4 (see Parts A & B above).  - AJCC pathologic stage:  pT3a pN0.    IMPRESSION AND PLAN  #Prostate adenocarcinoma s/p prostatectomy     During this follow-up visit today we were able to go over Davion's recovery from the prostatectomy.  He now has improved continence throughout most  of the day.  He reports being dry most of the day he does have some mild stress incontinence with lifting coughing or laughing.  He has no other pains or problems.  He did see a radiation oncologist which suggested that he would be a excellent candidate for salvage radiation therapy.    Given the results of the PSMA PET which have shown metastatic disease in the pelvis I am going to prescribe ADT plus abiraterone for 2 years. He is tolerating well and completed RT today    Will need next lupron in July ( got 6 month dose in Jan)    Will do quick follwoup w us in July (w RADHA)    Will complete Rx in December 2024/Jan 2025      CC Dr. Kendra Dejesus        SIGNED    Shane Dias MD  Professor of Medicine

## 2023-03-27 NOTE — LETTER
3/27/2023         RE: Davion Yang  [address] [place] [address]        Dear Colleague,    Thank you for referring your patient, Davion Yang, to the [facility]. Please see a copy of my visit note below.    Virtual Visit Details    Type of service:  Video Visit   Video Start Time: 1:08 PM  Video End Time:1:22 PM    Originating Location (pt. Location): Home    Distant Location (provider location):  On-site  Platform used for Video Visit: [facility] Medical Oncology Followup Consultation Note       Date of visit: March 27, 2023    Ml: new diagnosis oligo met CSPC post RP    HPI:   Patient is a 63 y/o M with a history of prostate cancer and metastatic esophageal adenocarcinoma (8/2013).    5/1/2018: Prostate biopsy demonstrated adenocarcinoma [place] 8 (4+4).   9/1/2022: Prostatectomy, pathologic evaluation demonstrated prostatic adenocarcinoma [place] 8 (4+4). 4 lymph nodes were removed and noninvolved pT3a, pN0  10/6/22: PSMA PET which demonstrated increased uptake in the prostate bed and bilateral pelvic lymph nodes.     PSA  6/15/2007 PSA 1.89  5/15/2013 PSA 4.79  6/2/2017 PSA 12.92  5/02/2019 PSA 20.77  12/12/2019 PSA 34.8  7/16/2020 PSA 34.63  1/27/2022 PSA 37.84  9/29/2022 PSA 13.21  1/2023 START LUPRON + Zytiga  2/7/23  PSA = 6.49  ---Radiation STart  3/13/23 PSA = 1.7  3/24/23 Radiation Complete.       Interval History:   Blood pressure ok  Urinary control is ok  Tolerating the Zytiga Pred well.         PMH:  Prostate Cancer  Esophageal carcinoma (2013)    MEDS  Current Outpatient Medications   Medication Instructions     abiraterone (ZYTIGA) 1,000 mg, Oral, DAILY, Take on empty stomach.     Aloe LIQD Oral, DAILY     predniSONE (DELTASONE) 5 mg, Oral, 2 TIMES DAILY     predniSONE (DELTASONE) 5 mg, Oral, 2 TIMES DAILY     sildenafil (REVATIO) 20 mg     vitamin D3 (CHOLECALCIFEROL) 50 mcg (2000 units) tablet 1 tablet, Oral, DAILY          Social:  Occupation: , part-time   Former smoker, quit   Denies alcohol use  Denies Illicit drug use    Family   Father: , unknown cancer (Possible metastatic prostate)     ROS  10-point review of systems performed. All negative except for symptoms noted above in HPI.    PHYSICAL EXAM    General: Patient appears well. Normal body habitus  HEENT: Normocephalic, atraumatic.   Cardiac: Regular rate and rhythm. No murmurs, rub, or extra heart sounds  Lungs: Clear to auscultation bilaterally  Abdomen: Normal bowel sounds. No tenderness to palpation    LABS AND IMAGES    10/6/2022 - PSMA PET CT  IMPRESSION:   1.   Abnormal radiotracer uptake within bilateral pelvic lymph nodes is compatible with metastatic disease.   2.  Abnormal radiotracer uptake at the site of prostatectomy and prior fluid collection is indeterminate. This is concerning for residual or recurrent disease, but the differential would include inflammatory findings related to the prior anastomotic leak and associated fluid collection.     Pathology 22    A.  Lymph nodes, right pelvic, regional resection:  - Three (3) lymph nodes identified, all negative for metastatic carcinoma (0/3).     B.  Lymph nodes, left pelvic, regional resection:  - One (1) lymph node identified, negative for metastatic carcinoma (0/1).     C.  Tip of left seminal vesicle, biopsy:  - Seminal vesicle tissue with no significant pathologic change.  - No malignancy identified.     D.  Bilateral ejaculatory ducts, biopsy:  - Ejaculatory duct tissue with no significant pathologic change.  - No malignancy identified.     E.  Prostate, radical prostatectomy:  - Prostatic adenocarcinoma, acinar type, with extensive cribriform growth.  (See comment)  - Histologic grade: Shanks score 4+4 = 8 (grade group 4).  - Extraprostatic extension identified (nonfocal); no seminal vesicle invasion identified.  - Margins negative.  - Lymph nodes (pelvic):  0/4 (see Parts A  & B above).  - AJCC pathologic stage:  pT3a pN0.    IMPRESSION AND PLAN  #Prostate adenocarcinoma s/p prostatectomy     During this follow-up visit today we were able to go over Davion's recovery from the prostatectomy.  He now has improved continence throughout most of the day.  He reports being dry most of the day he does have some mild stress incontinence with lifting coughing or laughing.  He has no other pains or problems.  He did see a radiation oncologist which suggested that he would be a excellent candidate for salvage radiation therapy.    Given the results of the PSMA PET which have shown metastatic disease in the pelvis I am going to prescribe ADT plus abiraterone for 2 years. He is tolerating well and completed RT today    Will need next lupron in July ( got 6 month dose in Jan)    Will do quick follwoup w us in July (w RADHA)    Will complete Rx in December 2024/Jan 2025      CC Dr. Kendra Dejesus        SIGNED    Shane Dias MD  Professor of Medicine              Again, thank you for allowing me to participate in the care of your patient.        Sincerely,        Shane Dias MD

## 2023-03-27 NOTE — LETTER
Date:March 28, 2023      Provider requested that no letter be sent. Do not send.       [facility]

## 2023-03-27 NOTE — NURSING NOTE
Is the patient currently in the state of [place]? YES    Visit mode:VIDEO    If the visit is dropped, the patient can be reconnected by: VIDEO VISIT: Send to e-mail at: [email or web address]    Will anyone else be joining the visit? YES: How would they like to receive their invitation?       How would you like to obtain your AVS? MyChart    Are changes needed to the allergy or medication list? NO    Reason for visit: Return Visit    Marcos MERCADO

## 2023-04-05 ENCOUNTER — TELEPHONE (OUTPATIENT)
Dept: ONCOLOGY | Facility: CLINIC | Age: 63
End: 2023-04-05
Payer: COMMERCIAL

## 2023-04-05 NOTE — ORAL ONC MGMT
Oral Chemotherapy Monitoring Program     Placed call to patient in follow up of oral chemotherapy. We have not seen any new labs in Care Everywhere, placed call to Davion to see if he has had a chance to schedule/obtain these.    Left message requesting call back. No drug names were mentioned. Will update when response received.     Hermelindo Trejo, PharmD, BCPS, [facility]  Hematology/Oncology Clinical Pharmacist  [facility]  [phone]

## 2023-04-06 NOTE — ORAL ONC MGMT
Oral Chemotherapy Monitoring Program     Spouse called back and stated they will try to get labs done at [facility] on 4/12. Will check for results next week (in Care Everywhere).     Thi Watt, PharmD, BCOP  Hematology/Oncology Clinical Pharmacist  [facility]  [facility]

## 2023-04-10 DIAGNOSIS — C61 PROSTATE CANCER (H): Primary | ICD-10-CM

## 2023-04-10 DIAGNOSIS — C77.9 REGIONAL LYMPH NODE METASTASIS PRESENT (H): ICD-10-CM

## 2023-04-13 ENCOUNTER — TELEPHONE (OUTPATIENT)
Dept: ONCOLOGY | Facility: CLINIC | Age: 63
End: 2023-04-13
Payer: COMMERCIAL

## 2023-04-13 DIAGNOSIS — C61 PROSTATE CANCER (H): Primary | ICD-10-CM

## 2023-04-13 DIAGNOSIS — C77.9 REGIONAL LYMPH NODE METASTASIS PRESENT (H): ICD-10-CM

## 2023-04-13 RX ORDER — ABIRATERONE ACETATE 250 MG/1
1000 TABLET ORAL DAILY
Qty: 120 TABLET | Refills: 0 | Status: SHIPPED | OUTPATIENT
Start: 2023-04-13 | End: 2023-05-13

## 2023-04-13 RX ORDER — PREDNISONE 5 MG/1
5 TABLET ORAL 2 TIMES DAILY
Qty: 60 TABLET | Refills: 0 | Status: SHIPPED | OUTPATIENT
Start: 2023-04-13 | End: 2023-09-18

## 2023-04-13 NOTE — ORAL ONC MGMT
Oral Chemotherapy Monitoring Program      Placed call to patient in follow up of oral chemotherapy. Left message requesting call back. No drug names were mentioned.     Jagdeep Hartman, PharmD  Hematology/Oncology Clinical Pharmacist  [facility]  [facility]

## 2023-04-26 ENCOUNTER — TELEPHONE (OUTPATIENT)
Dept: ONCOLOGY | Facility: CLINIC | Age: 63
End: 2023-04-26
Payer: COMMERCIAL

## 2023-04-26 NOTE — TELEPHONE ENCOUNTER
Per Dr. Dias: no concerns that low blood sugar is related to Zytiga. Pt to continue monitoring symptoms and follow-up with pcp if he is symptomatic.

## 2023-04-26 NOTE — TELEPHONE ENCOUNTER
Call received from Davion and his wife Gi regarding the blood sugar value resulted this a.m.  Pt and wife were concerned that pt had no sx of low blood sugar and are wondering what would cause it.  Pt does have hot flashes from being on zytiga.  Pt is taking prednisone 5 mg BID.  Took it at 4:30 a.m. before lab draw this a.m.    Discussed that prednisone can increase blood sugar, cause the pancreas to release insulin which lowers blood sugar. If pt did not eat (because he was fasting), the blood sugar would be lower.    Advised pt to bring a snack with him to the next fasting lab draw so he can eat afterwards or to take the prednisone after the lab draw next time.    Reviewed s/sx of low blood sugar including: Headache, blurred vision, shakiness, dizziness, weakness, tiredness, sweating, clamminess, fast, pounding heartbeat, pallor    They voiced understanding of this, but are concerned that he did not have any sx and are wondering if they need to have home glucose monitoring equipment. This writer told them that they would likely not need home glucose monitoring equipment if adjustments are made to when pt takes his a.m. dose of prednisone or if he takes along a snack for after the blood draw, but offered to send to care team to review/advise.    They would like Dr. Dias and/or Care Team to review and advise.    Message routed to Dr. Dias and Eden Negrete, RNCC to call family to advise.

## 2023-04-26 NOTE — TELEPHONE ENCOUNTER
CRITICAL VALUE:     DATE:  4/26/23    TIME OF RECEIPT FROM LAB:  8:45    LAB TEST:  Low Glucose 44     RESULTS SENT TO (PROVIDER):  Dr Dias     RECOMMENDATIONS: Attempted to call Davion to let him know that his glucose was low and that he should eat or drink something with sugar in it right away such as OJ and eat some protein. Got wife's voicemail that is the only number in the chart to call for this patient.     Left message x2 for patient on wife's number, which is the only number in patients chart.     Alfonso called back in and is eating something says he is feeling fine no side effects from low blood sugar.

## 2023-05-11 ENCOUNTER — TELEPHONE (OUTPATIENT)
Dept: ONCOLOGY | Facility: CLINIC | Age: 63
End: 2023-05-11
Payer: COMMERCIAL

## 2023-05-11 DIAGNOSIS — C77.9 REGIONAL LYMPH NODE METASTASIS PRESENT (H): ICD-10-CM

## 2023-05-11 DIAGNOSIS — C61 PROSTATE CANCER (H): Primary | ICD-10-CM

## 2023-05-11 RX ORDER — PREDNISONE 5 MG/1
5 TABLET ORAL 2 TIMES DAILY
Qty: 60 TABLET | Refills: 0 | Status: SHIPPED | OUTPATIENT
Start: 2023-05-11 | End: 2023-09-18

## 2023-05-11 RX ORDER — ABIRATERONE ACETATE 250 MG/1
1000 TABLET ORAL DAILY
Qty: 120 TABLET | Refills: 0 | Status: SHIPPED | OUTPATIENT
Start: 2023-05-11 | End: 2023-06-10

## 2023-06-05 ENCOUNTER — MYC MEDICAL ADVICE (OUTPATIENT)
Dept: ONCOLOGY | Facility: CLINIC | Age: 63
End: 2023-06-05
Payer: COMMERCIAL

## 2023-06-05 RX ORDER — ACETAMINOPHEN 500 MG
500-1000 TABLET ORAL EVERY 8 HOURS PRN
COMMUNITY
Start: 2023-06-05

## 2023-06-05 NOTE — TELEPHONE ENCOUNTER
Oral chemotherapy team reviewed. Trimix (not trimex) contains papaverine, phentolamine and alprostadil. This combination does not cause joint pain. Zytiga therapy is listed to cause artraliga (</= to 30%), joint swelling (</= 30%) and myalgia (25%). An alternative approach to Zytiga could be to try taking Zytiga 250mg daily with food (currently taking 1000mg daily on an empty stomach).     Will defer final plan to Dr. Hernandez/Dr. Dias.     Katy Davila, PharmD, BCACP  Hematology/Oncology Clinical Pharmacist  [facility]  [phone]

## 2023-06-05 NOTE — TELEPHONE ENCOUNTER
Oncology Nurse Triage - Pain    Situation: Davion/Gi  reporting the following symptoms: Pain    Background:   Treating Provider:   Dr. Dias     Date of last office visit: 3/27/23 Dr. Dias    Recent Treatments:Trimex and Abiraterone     Assessment:     Location: Right heel, knee, elbow, shoulder around joints  Onset A couple weeks ago  Duration/Frequency:everyday, intermittent throughout the day  Rates: 4-6/10  Quality: joint achey pain  Current med(s) used: Advil taking about 4 advil (800mg) once in morning. Pt is resistance to taking meds and likes to try and walk it out.     Pt is not taking Claritin.     Intermittent headaches going on for a few months, No every day, back of head on right side at times resolved with peppermint oil and massage at most 5/10 pain level.  Mainly occurs in morning. This writer educated on reflecting on if pt drinking enough clear fluids     Davion and Gi discussed concern that pt is feeling depressed, sad and if the feelings pt is having is normal?  Pt denied wanting to injure self/others. This writer discussed okay for pt to have feelings, pt is going through a lot. Pt in agreement with accepting a  referral to discuss support resources.     Denies fevers/chills, cough, sore throat, chest pain, SOB, headache, n/v, bowel & bladder issues, abdominal pain, rash, new or increased bleeding.     Recommendations:   1437 Paged Dr. Hernandez (on call for Dr. Dias)  Routed high priority to Onc care team and Oral Chemo.    1537 Per Dr. Hernandez, encourage Tylenol OTC for joint pain. Will defer to main oncologist Dr. Dias to make any final changes suggested by Oral Chemo. Also okay to offer pt an RADHA visit this week if RADHA is available to further assess symptoms.     1540 Paged Selin Sarabia about RADHA visit this week.   1601 Per Selin, no appts available at this time. Does recommend Dr. Dias to advise and scheduled to return tomorrow 6/6/23.     1610 This writer called and relayed  information to Gi. Asked Gi to repeat back info/plan. Gi verbalized understanding, Gi also wondering if Dr. Dias has any thoughts on Trimix. Trimix was ordered by a different provider, did not know name of provider during time of triage call but [facility] dose, amt, frequency and ordering prescriber.     Routed high priority to care team.

## 2023-06-06 DIAGNOSIS — C61 PROSTATE CANCER (H): Primary | ICD-10-CM

## 2023-06-06 DIAGNOSIS — C77.9 REGIONAL LYMPH NODE METASTASIS PRESENT (H): ICD-10-CM

## 2023-06-08 ENCOUNTER — TELEPHONE (OUTPATIENT)
Dept: ONCOLOGY | Facility: CLINIC | Age: 63
End: 2023-06-08
Payer: COMMERCIAL

## 2023-06-08 DIAGNOSIS — C77.9 REGIONAL LYMPH NODE METASTASIS PRESENT (H): ICD-10-CM

## 2023-06-08 DIAGNOSIS — C61 PROSTATE CANCER (H): Primary | ICD-10-CM

## 2023-06-08 RX ORDER — PREDNISONE 5 MG/1
5 TABLET ORAL 2 TIMES DAILY
Qty: 60 TABLET | Refills: 0 | Status: SHIPPED | OUTPATIENT
Start: 2023-06-08 | End: 2023-09-18

## 2023-06-08 RX ORDER — ABIRATERONE ACETATE 250 MG/1
1000 TABLET ORAL DAILY
Qty: 120 TABLET | Refills: 0 | Status: SHIPPED | OUTPATIENT
Start: 2023-06-08 | End: 2023-07-08

## 2023-06-08 NOTE — TELEPHONE ENCOUNTER
Oral Chemotherapy Monitoring Program    Subjective/Objective:  Davion Yang is a 62 year old male contacted by phone for a follow-up visit for oral chemotherapy. Gi, patient's wife, confirms he is taking the appropriate dose of Zytiga 1000mg (4x 250mg tablet) daily. Denies new or worsening side effects, missed doses, medication changes or recent hospital or ED visits.         3/13/2023    10:00 AM 3/17/2023    11:00 AM 4/5/2023    12:00 PM 4/13/2023     8:00 AM 5/12/2023    10:00 AM 6/8/2023     9:00 AM 6/8/2023    11:00 AM   ORAL CHEMOTHERAPY   Assessment Type Lab Monitoring Refill Left Voicemail Refill;Left Voicemail;Lab Monitoring Refill Refill Monthly Follow up   Diagnosis Code Prostate Cancer Prostate Cancer Prostate Cancer Prostate Cancer Prostate Cancer Prostate Cancer Prostate Cancer   Providers Dr. Arun Dias   [facility]   Is this patient followed by the [facility] OC team?    Yes Yes Yes Yes   Drug Name Zytiga (abiraterone) Zytiga (abiraterone) Zytiga (abiraterone) Zytiga (abiraterone) Zytiga (abiraterone) Zytiga (abiraterone) Zytiga (abiraterone)   Dose 1,000 mg 1,000 mg  1,000 mg 1,000 mg 1,000 mg 1,000 mg   Current Schedule Daily Daily  Daily Daily Daily Daily   Cycle Details Continuous Continuous  Continuous Continuous Continuous Continuous   Doses missed in last 2 weeks       0   Adherence Assessment       Adherent   Adverse Effects       No AE identified during assessment   Any new drug interactions?       No   Is the dose as ordered appropriate for the patient?       Yes   Since the last time we talked, have you been hospitalized or used the emergency room?       No       Last PHQ-2 Score on record:        View : No data to display.                Vitals:  BP:   BP Readings from Last 1 Encounters:   11/07/22 135/78     Wt Readings from Last 1 Encounters:   12/26/22 88.5 kg (195 lb)  "    Estimated body surface area is 2.09 meters squared as calculated from the following:    Height as of 12/26/22: 1.778 m (5' 10\").    Weight as of 12/26/22: 88.5 kg (195 lb).    Labs:        Assessment/Plan:  Davion continues to tolerate therapy well. Continue Zytiga therapy as planned.     Follow-Up:  6/22: labs  7/6: labs  7/7: monthly assessment  7/24: appt with Selin Fry Due:  [facility] to deliver on 6/12/23      Katy Davila, PharmD, BCACP  Hematology/Oncology Clinical Pharmacist  [facility]  [phone]    "

## 2023-06-15 ENCOUNTER — MYC MEDICAL ADVICE (OUTPATIENT)
Dept: ONCOLOGY | Facility: CLINIC | Age: 63
End: 2023-06-15
Payer: COMMERCIAL

## 2023-06-15 ENCOUNTER — PATIENT OUTREACH (OUTPATIENT)
Dept: ONCOLOGY | Facility: CLINIC | Age: 63
End: 2023-06-15
Payer: COMMERCIAL

## 2023-06-15 DIAGNOSIS — F32.A DEPRESSION: Primary | ICD-10-CM

## 2023-06-15 DIAGNOSIS — C61 PROSTATE CANCER (H): ICD-10-CM

## 2023-06-15 RX ORDER — BUPROPION HYDROCHLORIDE 75 MG/1
75 TABLET ORAL 2 TIMES DAILY
Status: CANCELLED
Start: 2023-06-15

## 2023-06-15 RX ORDER — BUPROPION HYDROCHLORIDE 150 MG/1
150 TABLET ORAL EVERY MORNING
COMMUNITY
Start: 2023-06-15

## 2023-06-15 NOTE — PROGRESS NOTES
"Spouse Gi called requesting a call back from Dr. Dias; stated she'd sent [employer] messages and spoken with several people (oral chemo pharmacist, RNCC via [employer], triage) regarding pt's symptoms that they think are from the Zytiga he started in January. This included depression, insomnia, varying joint painss and arthralgia. Oral chemo pharmacist already instructed pt to take Zytiga 4 times a day with food instead of entire dose (1000mg) daily.    He just saw his pcp Anup MARTINEZ who put him on Wellbutrin for depression, she's unsure of dose, stated \"the lowest, maybe 25 mg once a day). Care Everywhere does not show a completed progress note, does show buproprion  mg ordered on 6/13/2023. Writer called for progress note once finalized and left message for RN team to call back regarding pt's symptoms when seen.     Gi vaguely stated pt is seeing a mental health therapist but unsure if this is a SW or psychiatrist. This provider suggested a head scan in case pt's symptoms are neurological, Anup suggested otherwise and to speak with Oncology regarding request for scan and/or reducing dose of Zytiga. Writer did offer palliative care and mental health services through [facility] if pt was interested and Gi would like a referral sent.    Labs drawn 6/8 showed PSA as  0.01 and Gi asked if this means pt can stop the drug. Per Dr. Dias's notes from 3/27/2023 pt is to be on ADT plus abiraterone for 2 years, next Lupron injection due July and will see Selin Cornell RADHA then. Gi also thought he was to have another scan prior to seeing July, nothing about this in Dr. Dias's notes. Will discuss with care team and follow-up with Gi. Sent Auth to Share form via [facility] [employer] for pt to sign allowing Gi to call on his behalf.  "

## 2023-06-16 NOTE — TELEPHONE ENCOUNTER
Oral chemotherapy team has reviewed and appreciates the updates. If it is heard from the patient that he is holding therapy please let our team know.     Katy Davila, PharmD, BCACP  Hematology/Oncology Clinical Pharmacist  [facility]  [phone]

## 2023-06-16 NOTE — PROGRESS NOTES
Received vm from [facility] Family RN, stating all Anup recommended to pt was to start Wellbutrin for his symptoms, he did not recommend a MRI brain and asked pt to finish his treatment with oncology.    Response back from Dr. Dias regarding concerns: pt may hold Zytiga for one month to see if symptoms decrease, there is no harm done in terms of symptom control. Still recommended to finish 2 years of treatment.    Called number on file and goes automatically to [employer]'s voicemail; did not leave message as Auth to Share as not come back signed by pt.    Sent pt a mychart with above information and to inform writer if he does decide to hold Zytiga so that oral chemo team can be informed as well.

## 2023-06-19 ENCOUNTER — DOCUMENTATION ONLY (OUTPATIENT)
Dept: ONCOLOGY | Facility: CLINIC | Age: 63
End: 2023-06-19
Payer: COMMERCIAL

## 2023-07-10 ENCOUNTER — ONCOLOGY VISIT (OUTPATIENT)
Dept: ONCOLOGY | Facility: HOSPITAL | Age: 63
End: 2023-07-10
Attending: PSYCHOLOGIST
Payer: COMMERCIAL

## 2023-07-10 DIAGNOSIS — F43.21 ADJUSTMENT DISORDER WITH DEPRESSED MOOD: Primary | ICD-10-CM

## 2023-07-10 PROCEDURE — 90837 PSYTX W PT 60 MINUTES: CPT | Performed by: PSYCHOLOGIST

## 2023-07-10 NOTE — LETTER
7/10/2023         RE: Davion Yang  [address] [address]        Dear Colleague,    Thank you for referring your patient, Davion Yang, to the [facility] [place]. Please see a copy of my visit note below.          [facility] Oncology- Psychotherapy                                    Progress Note    Patient Name: Davion Yang  Date: 7/10/2023           Service Type: Individual      Session Start Time: 0945  Session End Time: 1040     Session Length: 55 mins    Session #: 1    Attendees: Client attended alone    Service Modality:  In-person    DATA  Interactive Complexity: No  Crisis: No        Progress Since Last Session (Related to Symptoms / Goals / Homework):   Symptoms: mood eithymic, sleep restless, eating well, energu is lower, recent low mood, concentration is lower, denies safety issues,  slight anxiety, fear of things not working,.     Homework: None      Episode of Care Goals: Satisfactory progress - CONTEMPLATION (Considering change and yet undecided); Intervened by assessing the negative and positive thinking (ambivalence) about behavior change     Current / Ongoing Stressors and Concerns:   Pt reports he is feeling confusion about his relationship with his S/O and his wife. He is living with his S/O for the past year. He is  to his wife of 35 years and still  has contact with her.      Pt reports he and his wife were seeing the same therapist and he decided that did not work for him and he is here today seeking other provider. He lives in [place], [place] so we will likely meet via zoom after this visit. He is ambivalent about therapy and medication for mental health.        Social Hx   X 35 years and . Pt lives with S/O for the past year.     Pt has 4 adult children: 19 daughter, 23 son, 28 , and 34 years olds.     Pt reports a hx of working in the [place] area. Pt reports he has been working since he was 11. He reports his  parents are . A brother  and a sister . He has a sister who is living but has no contact with her.     Pt reports he first had cancer 10 years and within the past two years had prostate surgery after prostate cancer dx.     Pt denies CD issues and last drank alcohol 36 years ago.     Pt denies a hx of MH issues.      Treatment Objective(s) Addressed in This Session:   identify family and medical  stressors which contribute to feelings of depression  .     Intervention:   CBT: ,  Solution Focused: ,    Assessments completed prior to visit:  None      ASSESSMENT: Current Emotional / Mental Status (status of significant symptoms):   Risk status (Self / Other harm or suicidal ideation)   Patient denies current fears or concerns for personal safety.   Patient denies current or recent suicidal ideation or behaviors.   Patient denies current or recent homicidal ideation or behaviors.   Patient denies current or recent self injurious behavior or ideation.   Patient denies other safety concerns.   Patient reports there has been no change in risk factors since their last session.     Patient reports there has been no change in protective factors since their last session.     Recommended that patient call 911 or go to the local ED should there be a change in any of these risk factors.     Appearance:   Appropriate    Eye Contact:   Good    Psychomotor Behavior: Normal    Attitude:   Cooperative    Orientation:   All   Speech    Rate / Production: Normal     Volume:  Normal    Mood:    Anxious    Affect:    Appropriate    Thought Content:  Clear    Thought Form:  Coherent  Logical    Insight:    Good      Medication Review:   No changes to current psychiatric medication(s)     Medication Compliance:   Yes     Changes in Health Issues:   None reported     Chemical Use Review:   Substance Use: Chemical use reviewed, no active concerns identified      Tobacco Use: No current tobacco use.      Diagnosis:  1.  Depression    F43.21 Adjusment d/o with depressive sx.     Collateral Reports Completed:   Not Applicable    PLAN: (Patient Tasks / Therapist Tasks / Other)  Return as needed, obtain CALM watson and use coping strategies of breathing exercises and meditation.         Kyle Lewis LP                                                         ______________________________________________________________________    Individual Treatment Plan    Patient's Name: Davion Yang  YOB: 1960    Date of Creation: 7/10/2023  Date Treatment Plan Last Reviewed/Revised: 7/10/2023      DSM5 Diagnoses: F43.21 adjustment disorder with depressive sx.   Psychosocial / Contextual Factors: Family and medical stress.   PROMIS (reviewed every 90 days):     Referral / Collaboration:  Referral to another professional/service is not indicated at this time..    Anticipated number of session for this episode of care: 9-12 sessions  Anticipation frequency of session: Biweekly  Anticipated Duration of each session: 53 or more minutes  Treatment plan will be reviewed in 90 days or when goals have been changed.       MeasurableTreatment Goal(s) related to diagnosis / functional impairment(s)  Goal 1: Patient will reduce depressive sx    Objective #A (Patient Action)    Patient will identify medical and family stressors which contribute to feelings of depression.  Status: New - Date: 7/10/23     Intervention(s)  Therapist will teach emotional regulation skills. ,.    Objective #B  Patient will identify medical and family  stressors which contribute to feelings of depression.  Status: New - Date: 7/10/23     Intervention(s)  Therapist will teach emotional recognition/identification. ,.    Objective #C  Patient will identify medical and family  stressors which contribute to feelings of depression.  Status: New - Date: 7/10/23     Intervention(s)  Therapist will teach distraction skills. ,.      Patient has reviewed and agreed to the  above plan.      Kyle Lewis LP  July 10, 2023        Again, thank you for allowing me to participate in the care of your patient.        Sincerely,        Kyle Lewis LP

## 2023-07-10 NOTE — PROGRESS NOTES
[facility] Oncology- Psychotherapy                                    Progress Note    Patient Name: Davion Yang  Date: 7/10/2023           Service Type: Individual      Session Start Time: 945  Session End Time:      Session Length: 55 mins    Session #: 1    Attendees: Client attended alone    Service Modality:  In-person    DATA  Interactive Complexity: No  Crisis: No        Progress Since Last Session (Related to Symptoms / Goals / Homework):   Symptoms: mood eithymic, sleep restless, eating well, energu is lower, recent low mood, concentration is lower, denies safety issues,  slight anxiety, fear of things not working,.     Homework: None      Episode of Care Goals: Satisfactory progress - CONTEMPLATION (Considering change and yet undecided); Intervened by assessing the negative and positive thinking (ambivalence) about behavior change     Current / Ongoing Stressors and Concerns:   Pt reports he is feeling confusion about his relationship with his S/O and his wife. He is living with his S/O for the past year. He is  to his wife of 35 years and still  has contact with her.      Pt reports he and his wife were seeing the same therapist and he decided that did not work for him and he is here today seeking other provider. He lives in [place], [place] so we will likely meet via zoom after this visit. He is ambivalent about therapy and medication for mental health.        Social Hx   X 35 years and . Pt lives with S/O for the past year.     Pt has 4 adult children: 19 daughter, 23 son, 28 , and 34 years olds.     Pt reports a hx of working in the [employer] area. Pt reports he has been working since he was 11. He reports his parents are . A brother  and a sister . He has a sister who is living but has no contact with her.     Pt reports he first had cancer 10 years and within the past two years had prostate surgery after prostate cancer dx.     Pt denies CD issues  and last drank alcohol 36 years ago.     Pt denies a hx of MH issues.      Treatment Objective(s) Addressed in This Session:   identify family and medical  stressors which contribute to feelings of depression  .     Intervention:   CBT: ,  Solution Focused: ,    Assessments completed prior to visit:  None      ASSESSMENT: Current Emotional / Mental Status (status of significant symptoms):   Risk status (Self / Other harm or suicidal ideation)   Patient denies current fears or concerns for personal safety.   Patient denies current or recent suicidal ideation or behaviors.   Patient denies current or recent homicidal ideation or behaviors.   Patient denies current or recent self injurious behavior or ideation.   Patient denies other safety concerns.   Patient reports there has been no change in risk factors since their last session.     Patient reports there has been no change in protective factors since their last session.     Recommended that patient call 911 or go to the local ED should there be a change in any of these risk factors.     Appearance:   Appropriate    Eye Contact:   Good    Psychomotor Behavior: Normal    Attitude:   Cooperative    Orientation:   All   Speech    Rate / Production: Normal     Volume:  Normal    Mood:    Anxious    Affect:    Appropriate    Thought Content:  Clear    Thought Form:  Coherent  Logical    Insight:    Good      Medication Review:   No changes to current psychiatric medication(s)     Medication Compliance:   Yes     Changes in Health Issues:   None reported     Chemical Use Review:   Substance Use: Chemical use reviewed, no active concerns identified      Tobacco Use: No current tobacco use.      Diagnosis:  1. Depression    F43.21 Adjusment d/o with depressive sx.     Collateral Reports Completed:   Not Applicable    PLAN: (Patient Tasks / Therapist Tasks / Other)  Return as needed, obtain CALM watson and use coping strategies of breathing exercises and meditation.          Kyle Lewis LP                                                         ______________________________________________________________________    Individual Treatment Plan    Patient's Name: Davion Yang  YOB: 1960    Date of Creation: 7/10/2023  Date Treatment Plan Last Reviewed/Revised: 7/10/2023      DSM5 Diagnoses: F43.21 adjustment disorder with depressive sx.   Psychosocial / Contextual Factors: Family and medical stress.   PROMIS (reviewed every 90 days):     Referral / Collaboration:  Referral to another professional/service is not indicated at this time..    Anticipated number of session for this episode of care: 9-12 sessions  Anticipation frequency of session: Biweekly  Anticipated Duration of each session: 53 or more minutes  Treatment plan will be reviewed in 90 days or when goals have been changed.       MeasurableTreatment Goal(s) related to diagnosis / functional impairment(s)  Goal 1: Patient will reduce depressive sx    Objective #A (Patient Action)    Patient will identify medical and family stressors which contribute to feelings of depression.  Status: New - Date: 7/10/23     Intervention(s)  Therapist will teach emotional regulation skills. ,.    Objective #B  Patient will identify medical and family  stressors which contribute to feelings of depression.  Status: New - Date: 7/10/23     Intervention(s)  Therapist will teach emotional recognition/identification. ,.    Objective #C  Patient will identify medical and family  stressors which contribute to feelings of depression.  Status: New - Date: 7/10/23     Intervention(s)  Therapist will teach distraction skills. ,.      Patient has reviewed and agreed to the above plan.      Kyle Lewis LP  July 10, 2023

## 2023-07-17 ENCOUNTER — VIRTUAL VISIT (OUTPATIENT)
Dept: ONCOLOGY | Facility: HOSPITAL | Age: 63
End: 2023-07-17
Attending: INTERNAL MEDICINE
Payer: COMMERCIAL

## 2023-07-17 DIAGNOSIS — F43.21 ADJUSTMENT DISORDER WITH DEPRESSED MOOD: Primary | ICD-10-CM

## 2023-07-17 PROCEDURE — 90837 PSYTX W PT 60 MINUTES: CPT | Mod: VID | Performed by: PSYCHOLOGIST
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
## 2023-07-19 ENCOUNTER — PATIENT OUTREACH (OUTPATIENT)
Dept: ONCOLOGY | Facility: CLINIC | Age: 63
End: 2023-07-19
Payer: COMMERCIAL

## 2023-07-19 NOTE — TELEPHONE ENCOUNTER
[facility]: Cancer Care                                                                                          Called pt and SO to find out if he had gotten labs locally in preparation for his virtual visit with Selin Sarabia RADHA 7/21. Can see in Care Everywhere his last CMP was 7/6/23 and PSA 6/8/23. Gi stated he has labs tomorrow and he has held his Zytiga since last time writer spoke to them (6/15/23). Will discuss with Selin if PSA is stable and need for continuation of Zytiga.    Signature:  Rosaline Negrete RN

## 2023-07-21 ENCOUNTER — MYC MEDICAL ADVICE (OUTPATIENT)
Dept: ONCOLOGY | Facility: CLINIC | Age: 63
End: 2023-07-21
Payer: COMMERCIAL

## 2023-07-21 DIAGNOSIS — C61 PROSTATE CANCER (H): Primary | ICD-10-CM

## 2023-07-24 NOTE — TELEPHONE ENCOUNTER
PSA order faxed to [facility]: [phone] pt scheduled for labs 7/31 and rescheduled visit with Alice Hawthorne RADHA from this week to after labs next week.

## 2023-08-01 ENCOUNTER — VIRTUAL VISIT (OUTPATIENT)
Dept: ONCOLOGY | Facility: HOSPITAL | Age: 63
End: 2023-08-01
Attending: PSYCHOLOGIST
Payer: COMMERCIAL

## 2023-08-01 DIAGNOSIS — F43.21 ADJUSTMENT DISORDER WITH DEPRESSED MOOD: Primary | ICD-10-CM

## 2023-08-01 PROCEDURE — 90837 PSYTX W PT 60 MINUTES: CPT | Mod: VID | Performed by: PSYCHOLOGIST
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
## 2023-08-03 ENCOUNTER — VIRTUAL VISIT (OUTPATIENT)
Dept: ONCOLOGY | Facility: CLINIC | Age: 63
End: 2023-08-03
Attending: INTERNAL MEDICINE
Payer: COMMERCIAL

## 2023-08-03 VITALS
WEIGHT: 205 LBS | BODY MASS INDEX: 29.35 KG/M2 | SYSTOLIC BLOOD PRESSURE: 129 MMHG | DIASTOLIC BLOOD PRESSURE: 85 MMHG | HEIGHT: 70 IN

## 2023-08-03 DIAGNOSIS — N52.31 ERECTILE DYSFUNCTION AFTER RADICAL PROSTATECTOMY: ICD-10-CM

## 2023-08-03 DIAGNOSIS — C61 PROSTATE CANCER (H): Primary | ICD-10-CM

## 2023-08-03 PROCEDURE — 99215 OFFICE O/P EST HI 40 MIN: CPT | Mod: 95

## 2023-08-03 ASSESSMENT — PAIN SCALES - GENERAL: PAINLEVEL: NO PAIN (0)

## 2023-08-03 NOTE — NURSING NOTE
Is the patient currently in the state of [place]? YES    Visit mode:VIDEO    If the visit is dropped, the patient can be reconnected by: VIDEO VISIT: Send to e-mail at: [email or web address]    Will anyone else be joining the visit? NO    How would you like to obtain your AVS? MyChart    Are changes needed to the allergy or medication list? YES: Please remove Prednisone, Add Lupron and Trimix.    Livia MERCADO    Reason for visit: RECHECK

## 2023-08-03 NOTE — PROGRESS NOTES
"Virtual Visit Details    Type of service:  Video Visit   Video Start Time:  9:16 AM  Video End Time:9:48 AM    Originating Location (pt. Location): Home    Distant Location (provider location):  On-site  Platform used for Video Visit: [facility] Medical Oncology Followup Consultation Note       Date of visit: Aug 3, 2023      Ml: new diagnosis oligo met CSPC post RP    HPI:   Patient is a 63 year old M with a history of prostate cancer and metastatic esophageal adenocarcinoma (8/2013).    5/1/2018: Prostate biopsy demonstrated adenocarcinoma Jules 8 (4+4).   9/1/2022: Prostatectomy, pathologic evaluation demonstrated prostatic adenocarcinoma [place] 8 (4+4). 4 lymph nodes were removed and noninvolved pT3a, pN0  10/6/22: PSMA PET which demonstrated increased uptake in the prostate bed and bilateral pelvic lymph nodes.     PSA  6/15/2007 PSA 1.89  5/15/2013 PSA 4.79  6/2/2017 PSA 12.92  5/02/2019 PSA 20.77  12/12/2019 PSA 34.8  7/16/2020 PSA 34.63  1/27/2022 PSA 37.84  9/29/2022 PSA 13.21  1/2023 START LUPRON + Zytiga  2/7/23  PSA = 6.49  ---Radiation STart  3/13/23 PSA = 1.7  3/24/23 Radiation Complete.   07/31/23        PSA = <0.10, T <7    Interval History:   Has been holding Zytiga since 06/17/23. He had ankle, shoulder, and pain all on the right side. Along with a right sided headache. He found that with in one week his arthralgias improved. He has a long standing history of R shoulder pain feels this is better. His headaches have also improved. His last headache was last week. Energy is \"okay but room for improvement\". He feels his energy is still low. He is active around the yard and boat. He also will take daily naps. He endorses hot flashes- fluctuates in intensity. Blood pressure has been \"pretty good\". No urinary changes. No peripheral edema. Completed radiation in march. Last lupron on 07/24/23, 3 month dose.     Mental health has been \"a struggle\". Started Wellbutrin early July with " improvement. No suicidal thoughts. Felt a huge change in mood after stopping abiraterone and wellbutrin. He has erectile dysfunction and would like a referral to urology through  as he has not found what his local urologist has suggested to be helpful.    His radiation oncologist told him abiraterone is only used for bone metastasis.     PMH:  Prostate Cancer  Esophageal carcinoma ()    MEDS  Current Outpatient Medications   Medication Instructions    abiraterone (ZYTIGA) 1,000 mg, Oral, DAILY, Take on empty stomach.    Aloe LIQD Oral, DAILY    predniSONE (DELTASONE) 5 mg, Oral, 2 TIMES DAILY    predniSONE (DELTASONE) 5 mg, Oral, 2 TIMES DAILY    sildenafil (REVATIO) 20 mg    vitamin D3 (CHOLECALCIFEROL) 50 mcg (2000 units) tablet 1 tablet, Oral, DAILY         Social:  Occupation: , part-time   Former smoker, quit   Denies alcohol use  Denies Illicit drug use    Family   Father: , unknown cancer (Possible metastatic prostate)     ROS  10-point review of systems performed. All negative except for symptoms noted above in HPI.    VIDEO PHYSICAL EXAM  General: Patient appears well. Normal body habitus  HEENT: Normocephalic, atraumatic.   Cardiac: Regular rate and rhythm. No murmurs, rub, or extra heart sounds  Lungs: Clear to auscultation bilaterally  Abdomen: Normal bowel sounds. No tenderness to palpation    LABS AND IMAGES    10/6/2022 - PSMA PET CT  IMPRESSION:   1.   Abnormal radiotracer uptake within bilateral pelvic lymph nodes is compatible with metastatic disease.   2.  Abnormal radiotracer uptake at the site of prostatectomy and prior fluid collection is indeterminate. This is concerning for residual or recurrent disease, but the differential would include inflammatory findings related to the prior anastomotic leak and associated fluid collection.     Pathology 22    A.  Lymph nodes, right pelvic, regional resection:  - Three (3) lymph nodes identified, all negative for metastatic  carcinoma (0/3).     B.  Lymph nodes, left pelvic, regional resection:  - One (1) lymph node identified, negative for metastatic carcinoma (0/1).     C.  Tip of left seminal vesicle, biopsy:  - Seminal vesicle tissue with no significant pathologic change.  - No malignancy identified.     D.  Bilateral ejaculatory ducts, biopsy:  - Ejaculatory duct tissue with no significant pathologic change.  - No malignancy identified.     E.  Prostate, radical prostatectomy:  - Prostatic adenocarcinoma, acinar type, with extensive cribriform growth.  (See comment)  - Histologic grade: [place] score 4+4 = 8 (grade group 4).  - Extraprostatic extension identified (nonfocal); no seminal vesicle invasion identified.  - Margins negative.  - Lymph nodes (pelvic):  0/4 (see Parts A & B above).  - AJCC pathologic stage:  pT3a pN0.    IMPRESSION AND PLAN  #Prostate adenocarcinoma with metastatic disease in the pelvis s/p prostatectomy and salvage radiation  - Recommended to proceed with ADT plus abiraterone for 2 years. Patient had increased arthralgias, headaches, and worsened mood on 1,000 mg abiraterone. He has been holding since 06/17/23.   - His PSA is undetectable at <0.10 with suppressed T <7. We reviewed that this is the expected effect of ADT + abiraterone. Undetectable PSA is not a reason to stop therapies.   - Reviewed with local metastatic disease in the pelvis he is a candidate for both ADT plus abiraterone without bone metastasis per the STAMPEDE trial. Per the trial, men with locally advanced or metastatic prostate cancer who received ADT plus abiraterone and prednisolone had significantly higher rates of overall and failure-free survival than those who received ADT alone. I discussed these with Davion today.   - I recommended considering restarting abiraterone at a dose reduction of 750 mg per day on an empty stomach given side effects at full dose. Davion will consider this and let me know what he decides.     #erectile  dysfunction  - has tried sildenafil and trimix. Discussed I refer patients to PCP or urology to help with management. Would like to establish with urology at [place].     #Mood  - continue wellbutrin per PCP   - continue therapy.     #Follow up  - establish with Dr. Branham or Dr. Swan with the absence of Dr. Arun mosquera.   - RTC in 4 weeks if restarting abiraterone for symptom follow up with labs locally or 3 months if lupron alone with a PSA and testosterone locally prior.     45 minutes spent on the date of the encounter doing chart review, review of test results, patient visit, and documentation     Alice Hawthorne PA-C    Addendum   Patient agreeable to start at reduced dose of Abiraterone 750 mg on an empty stomach. Will have him return to clinic with Dr. Swan on 09/13/23 to establish care and close symptom monitoring with restarting Abiraterone.     Alice Hawthorne PA-C

## 2023-08-03 NOTE — Clinical Note
"    8/3/2023         RE: Davion Yang  [address] [place] [address]        Dear Colleague,    Thank you for referring your patient, Davion Yang, to the [facility]. Please see a copy of my visit note below.    Virtual Visit Details    Type of service:  Video Visit   Video Start Time:  9:16 AM  Video End Time:9:48 AM    Originating Location (pt. Location): Home    Distant Location (provider location):  On-site  Platform used for Video Visit: [facility] Medical Oncology Followup Consultation Note       Date of visit: Aug 3, 2023      Ml: new diagnosis oligo met CSPC post RP    HPI:   Patient is a 63 year old M with a history of prostate cancer and metastatic esophageal adenocarcinoma (8/2013).    5/1/2018: Prostate biopsy demonstrated adenocarcinoma Jules 8 (4+4).   9/1/2022: Prostatectomy, pathologic evaluation demonstrated prostatic adenocarcinoma [place] 8 (4+4). 4 lymph nodes were removed and noninvolved pT3a, pN0  10/6/22: PSMA PET which demonstrated increased uptake in the prostate bed and bilateral pelvic lymph nodes.     PSA  6/15/2007 PSA 1.89  5/15/2013 PSA 4.79  6/2/2017 PSA 12.92  5/02/2019 PSA 20.77  12/12/2019 PSA 34.8  7/16/2020 PSA 34.63  1/27/2022 PSA 37.84  9/29/2022 PSA 13.21  1/2023 START LUPRON + Zytiga  2/7/23  PSA = 6.49  ---Radiation STart  3/13/23 PSA = 1.7  3/24/23 Radiation Complete.   07/31/23        PSA = <0.10, T <7    Interval History:   Has been holding Zytiga since 06/17/23. He had ankle, shoulder, and pain all on the right side. Along with a right sided headache. He found that with in one week his arthralgias improved. He has a long standing history of R shoulder pain feels this is better. His headaches have also improved. His last headache was last week. Energy is \"okay but room for improvement\". He feels his energy is still low. He is active around the yard and boat. He also will take daily naps. He endorses hot flashes- " "fluctuates in intensity. Blood pressure has been \"pretty good\". No urinary changes. No peripheral edema. Completed radiation in march. Last lupron on 23, 3 month dose.     Mental health has been \"a struggle\". Started Wellbutrin early July with improvement. No suicidal thoughts. Felt a huge change in mood after stopping abiraterone and wellbutrin. He has erectile dysfunction and would like a referral to urology through  as he has not found what his local urologist has suggested to be helpful.    His radiation oncologist told him abiraterone is only used for bone metastasis.     PMH:  Prostate Cancer  Esophageal carcinoma ()    MEDS  Current Outpatient Medications   Medication Instructions    abiraterone (ZYTIGA) 1,000 mg, Oral, DAILY, Take on empty stomach.    Aloe LIQD Oral, DAILY    predniSONE (DELTASONE) 5 mg, Oral, 2 TIMES DAILY    predniSONE (DELTASONE) 5 mg, Oral, 2 TIMES DAILY    sildenafil (REVATIO) 20 mg    vitamin D3 (CHOLECALCIFEROL) 50 mcg (2000 units) tablet 1 tablet, Oral, DAILY         Social:  Occupation: , part-time   Former smoker, quit   Denies alcohol use  Denies Illicit drug use    Family   Father: , unknown cancer (Possible metastatic prostate)     ROS  10-point review of systems performed. All negative except for symptoms noted above in HPI.    VIDEO PHYSICAL EXAM  General: Patient appears well. Normal body habitus  HEENT: Normocephalic, atraumatic.   Cardiac: Regular rate and rhythm. No murmurs, rub, or extra heart sounds  Lungs: Clear to auscultation bilaterally  Abdomen: Normal bowel sounds. No tenderness to palpation    LABS AND IMAGES    10/6/2022 - PSMA PET CT  IMPRESSION:   1.   Abnormal radiotracer uptake within bilateral pelvic lymph nodes is compatible with metastatic disease.   2.  Abnormal radiotracer uptake at the site of prostatectomy and prior fluid collection is indeterminate. This is concerning for residual or recurrent disease, but the " differential would include inflammatory findings related to the prior anastomotic leak and associated fluid collection.     Pathology 9-1-22    A.  Lymph nodes, right pelvic, regional resection:  - Three (3) lymph nodes identified, all negative for metastatic carcinoma (0/3).     B.  Lymph nodes, left pelvic, regional resection:  - One (1) lymph node identified, negative for metastatic carcinoma (0/1).     C.  Tip of left seminal vesicle, biopsy:  - Seminal vesicle tissue with no significant pathologic change.  - No malignancy identified.     D.  Bilateral ejaculatory ducts, biopsy:  - Ejaculatory duct tissue with no significant pathologic change.  - No malignancy identified.     E.  Prostate, radical prostatectomy:  - Prostatic adenocarcinoma, acinar type, with extensive cribriform growth.  (See comment)  - Histologic grade: [place] score 4+4 = 8 (grade group 4).  - Extraprostatic extension identified (nonfocal); no seminal vesicle invasion identified.  - Margins negative.  - Lymph nodes (pelvic):  0/4 (see Parts A & B above).  - AJCC pathologic stage:  pT3a pN0.    IMPRESSION AND PLAN  #Prostate adenocarcinoma with metastatic disease in the pelvis s/p prostatectomy and salvage radiation  - Recommended to proceed with ADT plus abiraterone for 2 years. Patient had increased arthralgias, headaches, and worsened mood on 1,000 mg abiraterone. He has been holding since 06/17/23.   - His PSA is undetectable at <0.10 with suppressed T <7. We reviewed that this is the expected effect of ADT + abiraterone. Undetectable PSA is not a reason to stop therapies.   - Reviewed with local metastatic disease in the pelvis he is a candidate for both ADT plus abiraterone without bone metastasis per the STAMPEDE trial. Per the trial, men with locally advanced or metastatic prostate cancer who received ADT plus abiraterone and prednisolone had significantly higher rates of overall and failure-free survival than those who received ADT  "alone. I discussed these with Davion today.   - I recommended considering restartin abiraterone at a dose reduction of 750 mg per day on an empty stomach given side effects at full dose. Davion will consider this and let me know what he decides.       Follow up  - establoish           Virtual Visit Details    Type of service:  Video Visit   Video Start Time:  9:16 AM  Video End Time:9:48 AM    Originating Location (pt. Location): Home    Distant Location (provider location):  On-site  Platform used for Video Visit: [facility] Medical Oncology Followup Consultation Note       Date of visit: Aug 3, 2023      Ml: new diagnosis oligo met CSPC post RP    HPI:   Patient is a 63 year old M with a history of prostate cancer and metastatic esophageal adenocarcinoma (8/2013).    5/1/2018: Prostate biopsy demonstrated adenocarcinoma [place] 8 (4+4).   9/1/2022: Prostatectomy, pathologic evaluation demonstrated prostatic adenocarcinoma [place] 8 (4+4). 4 lymph nodes were removed and noninvolved pT3a, pN0  10/6/22: PSMA PET which demonstrated increased uptake in the prostate bed and bilateral pelvic lymph nodes.     PSA  6/15/2007 PSA 1.89  5/15/2013 PSA 4.79  6/2/2017 PSA 12.92  5/02/2019 PSA 20.77  12/12/2019 PSA 34.8  7/16/2020 PSA 34.63  1/27/2022 PSA 37.84  9/29/2022 PSA 13.21  1/2023 START LUPRON + Zytiga  2/7/23  PSA = 6.49  ---Radiation STart  3/13/23 PSA = 1.7  3/24/23 Radiation Complete.   07/31/23        PSA = <0.10, T <7    Interval History:   Has been holding Zytiga since 06/17/23. He had ankle, shoulder, and pain all on the right side. Along with a right sided headache. He found that with in one week his arthralgias improved. He has a long standing history of R shoulder pain feels this is better. His headaches have also improved. His last headache was last week. Energy is \"okay but room for improvement\". He feels his energy is still low. He is active around the yard and boat. He also will take " "daily naps. He endorses hot flashes- fluctuates in intensity. Blood pressure has been \"pretty good\". No urinary changes. No peripheral edema. Completed radiation in march. Last lupron on 23, 3 month dose.     Mental health has been \"a struggle\". Started Wellbutrin early July with improvement. No suicidal thoughts. Felt a huge change in mood after stopping abiraterone and wellbutrin. He has erectile dysfunction and would like a referral to urology through  as he has not found what his local urologist has suggested to be helpful.    His radiation oncologist told him abiraterone is only used for bone metastasis.     PMH:  Prostate Cancer  Esophageal carcinoma ()    MEDS  Current Outpatient Medications   Medication Instructions     abiraterone (ZYTIGA) 1,000 mg, Oral, DAILY, Take on empty stomach.     Aloe LIQD Oral, DAILY     predniSONE (DELTASONE) 5 mg, Oral, 2 TIMES DAILY     predniSONE (DELTASONE) 5 mg, Oral, 2 TIMES DAILY     sildenafil (REVATIO) 20 mg     vitamin D3 (CHOLECALCIFEROL) 50 mcg (2000 units) tablet 1 tablet, Oral, DAILY         Social:  Occupation: , part-time   Former smoker, quit   Denies alcohol use  Denies Illicit drug use    Family   Father: , unknown cancer (Possible metastatic prostate)     ROS  10-point review of systems performed. All negative except for symptoms noted above in HPI.    VIDEO PHYSICAL EXAM  General: Patient appears well. Normal body habitus  HEENT: Normocephalic, atraumatic.   Cardiac: Regular rate and rhythm. No murmurs, rub, or extra heart sounds  Lungs: Clear to auscultation bilaterally  Abdomen: Normal bowel sounds. No tenderness to palpation    LABS AND IMAGES    10/6/2022 - PSMA PET CT  IMPRESSION:   1.   Abnormal radiotracer uptake within bilateral pelvic lymph nodes is compatible with metastatic disease.   2.  Abnormal radiotracer uptake at the site of prostatectomy and prior fluid collection is indeterminate. This is concerning for " residual or recurrent disease, but the differential would include inflammatory findings related to the prior anastomotic leak and associated fluid collection.     Pathology 9-1-22    A.  Lymph nodes, right pelvic, regional resection:  - Three (3) lymph nodes identified, all negative for metastatic carcinoma (0/3).     B.  Lymph nodes, left pelvic, regional resection:  - One (1) lymph node identified, negative for metastatic carcinoma (0/1).     C.  Tip of left seminal vesicle, biopsy:  - Seminal vesicle tissue with no significant pathologic change.  - No malignancy identified.     D.  Bilateral ejaculatory ducts, biopsy:  - Ejaculatory duct tissue with no significant pathologic change.  - No malignancy identified.     E.  Prostate, radical prostatectomy:  - Prostatic adenocarcinoma, acinar type, with extensive cribriform growth.  (See comment)  - Histologic grade: [place] score 4+4 = 8 (grade group 4).  - Extraprostatic extension identified (nonfocal); no seminal vesicle invasion identified.  - Margins negative.  - Lymph nodes (pelvic):  0/4 (see Parts A & B above).  - AJCC pathologic stage:  pT3a pN0.    IMPRESSION AND PLAN  #Prostate adenocarcinoma with metastatic disease in the pelvis s/p prostatectomy and salvage radiation  - Recommended to proceed with ADT plus abiraterone for 2 years. Patient had increased arthralgias, headaches, and worsened mood on 1,000 mg abiraterone. He has been holding since 06/17/23.   - His PSA is undetectable at <0.10 with suppressed T <7. We reviewed that this is the expected effect of ADT + abiraterone. Undetectable PSA is not a reason to stop therapies.   - Reviewed with local metastatic disease in the pelvis he is a candidate for both ADT plus abiraterone without bone metastasis per the STAMPEDE trial. Per the trial, men with locally advanced or metastatic prostate cancer who received ADT plus abiraterone and prednisolone had significantly higher rates of overall and failure-free  survival than those who received ADT alone. I discussed these with Davion today.   - I recommended considering restarting abiraterone at a dose reduction of 750 mg per day on an empty stomach given side effects at full dose. Davion will consider this and let me know what he decides.     #erectile dysfunction  - has tried sildenafil and trimix. Discussed I refer patients to PCP or urology to help with management. Would like to establish with urology at [place].     #Mood  - continue wellbutrin per PCP   - continue therapy.     #Follow up  - establish with Dr. Branham or Dr. Swan with the absence of Dr. Arun mosquera.   - RTC in 4 weeks if restarting abiraterone for symptom follow up or 3 months if lupron alone with a PSA and testosterone locally prior.     *** minutes spent on the date of the encounter doing {2021 E&M time in:823287}     Alice Hawthorne PA-C                    Again, thank you for allowing me to participate in the care of your patient.        Sincerely,        Alice Hawthorne PA-C

## 2023-08-08 ENCOUNTER — VIRTUAL VISIT (OUTPATIENT)
Dept: UROLOGY | Facility: CLINIC | Age: 63
End: 2023-08-08
Payer: COMMERCIAL

## 2023-08-08 DIAGNOSIS — N52.31 ERECTILE DYSFUNCTION AFTER RADICAL PROSTATECTOMY: ICD-10-CM

## 2023-08-08 DIAGNOSIS — C61 PROSTATE CANCER (H): ICD-10-CM

## 2023-08-08 PROCEDURE — 99203 OFFICE O/P NEW LOW 30 MIN: CPT | Mod: VID | Performed by: STUDENT IN AN ORGANIZED HEALTH CARE EDUCATION/TRAINING PROGRAM

## 2023-08-08 NOTE — PROGRESS NOTES
Chief Complaint:   Erectile dysfunction          History of Present Illness:   Davion Yang is a 63 year old male with a history of prostate cancer and esophageal cancer with complaints of erectile dysfunction.     The patient had a RALP in September 2022 for Jules 4+4=8 prostate cancer, pT3a N0 and salvage radiation. He is now maintained on ADT plus abiraterone.     The patient has followed with [facility] for management of erectile dysfunction. Most recently, he has tried TriMix 0.10 mL with a vacuum erection device.     He has tried sildenafil 100 mg, which was not helpful. He has also tried a vacuum erection device and Trimix. The last two times he used the Trimix, he had an erection that lasted 6-8 hours. He has never tried single agent alprostadil. He currently uses [email or web address] Trimix # 9-Papaverine 29.4 mg/Phentolamine 1 mg/Alprostadil 10 mcg/mL.           Past Medical History:   No past medical history on file.         Past Surgical History:   No past surgical history on file.         Medications     Current Outpatient Medications   Medication    acetaminophen (TYLENOL) 500 MG tablet    Aloe LIQD    buPROPion (WELLBUTRIN XL) 150 MG 24 hr tablet    predniSONE (DELTASONE) 5 MG tablet    predniSONE (DELTASONE) 5 MG tablet    predniSONE (DELTASONE) 5 MG tablet    predniSONE (DELTASONE) 5 MG tablet    predniSONE (DELTASONE) 5 MG tablet    sildenafil (REVATIO) 20 MG tablet    vitamin D3 (CHOLECALCIFEROL) 50 mcg (2000 units) tablet     No current facility-administered medications for this visit.            Allergies:   Other environmental allergy and Penicillins         Review of Systems:  From intake questionnaire   Negative 14 system review except as noted on HPI, nurse's note.         Assessment and Plan:     Assessment: 63 year old male with a history of prostate cancer s/p RALP, salvage radiation, and ADT seen in evaluation for erectile dysfunction.     He has tried sildenafil 100 mg, which was  not helpful. He has also tried a vacuum erection device and Trimix. The last two times he used the Trimix, he had an erection that lasted 6-8 hours. He has never tried single agent alprostadil. He currently uses [employer] Trimix # 9-Papaverine 29.4 mg/Phentolamine 1 mg/Alprostadil 10 mcg/mL. We discussed a trial of single agent alprostadil given recent prolonged erections. If not effective at increased doses, could try low doses of Trimix #8.     Plan:  Alprostadil, inject 0.2-0.4 mL via intracavernous route prn for erectile dysfunction.     FELTON BERGMAN PA-C  Department of Urology

## 2023-08-08 NOTE — PROGRESS NOTES
Davion Yang is a 63 year old year old who is being evaluated via a billable video visit.      How would you like to obtain your AVS? MyChart  If the video visit is dropped, the invitation should be resent by: Text to cell phone: [phone]  Will anyone else be joining your video visit? Yes, girlfriend Haydee will be joining in person    Video-Visit Details    Type of service:  Video Visit   Video Start Time: 11:00 AM  Video End Time:11:19 AM    Originating Location (pt. Location): Home    Distant Location (provider location):  On-site  Platform used for Video Visit: [employer]

## 2023-08-10 ENCOUNTER — TELEPHONE (OUTPATIENT)
Dept: ONCOLOGY | Facility: CLINIC | Age: 63
End: 2023-08-10

## 2023-08-10 ENCOUNTER — VIRTUAL VISIT (OUTPATIENT)
Dept: ONCOLOGY | Facility: HOSPITAL | Age: 63
End: 2023-08-10
Attending: PHYSICIAN ASSISTANT
Payer: COMMERCIAL

## 2023-08-10 DIAGNOSIS — C77.9 REGIONAL LYMPH NODE METASTASIS PRESENT (H): ICD-10-CM

## 2023-08-10 DIAGNOSIS — C61 PROSTATE CANCER (H): Primary | ICD-10-CM

## 2023-08-10 DIAGNOSIS — F43.21 ADJUSTMENT DISORDER WITH DEPRESSED MOOD: Primary | ICD-10-CM

## 2023-08-10 PROCEDURE — 90837 PSYTX W PT 60 MINUTES: CPT | Mod: VID | Performed by: PSYCHOLOGIST

## 2023-08-10 RX ORDER — ABIRATERONE ACETATE 250 MG/1
750 TABLET ORAL DAILY
Qty: 90 TABLET | Refills: 0 | Status: SHIPPED | OUTPATIENT
Start: 2023-08-10 | End: 2023-09-09

## 2023-08-10 RX ORDER — PREDNISONE 5 MG/1
5 TABLET ORAL 2 TIMES DAILY
Qty: 60 TABLET | Refills: 0 | Status: SHIPPED | OUTPATIENT
Start: 2023-08-10 | End: 2023-09-18
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
## 2023-08-10 NOTE — ORAL ONC MGMT
Oral Chemotherapy Monitoring Program    Placed call in follow up of oral chemotherapy plan. Spoke to Davion. Patient was given medication information at 8/3 visit. Davion reports taking last dose of abiraterone on 6/17/23 and would like to resume abiraterone at a reduced dose of 750mg once daily on empty stomach. Sent message to care team to update. Patient will reach out to clinic or pharmacy team with any new or worse side effects. Patient is aware of next visit and labs. [facility] will deliver refill 8/14.     Follow up:  9/13: Dr. Swan visit and labs     Jagdeep Hartman, PharmD  Hematology/Oncology Clinical Pharmacist  [facility]  [facility]  [phone]

## 2023-08-13 ENCOUNTER — MYC MEDICAL ADVICE (OUTPATIENT)
Dept: ONCOLOGY | Facility: CLINIC | Age: 63
End: 2023-08-13
Payer: COMMERCIAL

## 2023-08-14 ENCOUNTER — TELEPHONE (OUTPATIENT)
Dept: ONCOLOGY | Facility: CLINIC | Age: 63
End: 2023-08-14
Payer: COMMERCIAL

## 2023-08-14 NOTE — ORAL ONC MGMT
Oral Chemotherapy Monitoring Program    [facility].    Patient had questions about pharmacokinetics of abiraterone including information about half-life, absorption rate, and interactions with food. Placed call to patient, spoke with Davion about pharmacokinetics and interactions with food. Questions answered to his stated satisfaction and agreeable to proceed with the reduced dose of abiraterone on empty stomach. He will restart abiraterone at 750mg once daily on empty stomach tonight 8/14 and follow up with clinic with any new side effects.    Jagdeep Hartman, PharmD  Hematology/Oncology Clinical Pharmacist  [facility]  [facility]  [phone]

## 2023-08-14 NOTE — TELEPHONE ENCOUNTER
[facility]: Cancer Care                                                                                          Called pt and spouse back to clarify why they are requesting a 2nd virtual visit with Alice GARCIA; per Gi they researched both the decrease dose and a trial at the [facility], she doesn't remember name but will forward information. Received Rx today for 750 mg daily and pt does want to proceed but has the following questions before taking.    So indecisive whether to continue with recommendation of:  Dose decrease of 750 mg per day    Or if care team will approve this dose per trial research:  Food trial with just 250 mg per day plus prednisone  Will attach information to [employer] for care team to review    Signature:  Rosaline Negrete RN

## 2023-08-23 ENCOUNTER — MYC MEDICAL ADVICE (OUTPATIENT)
Dept: ONCOLOGY | Facility: CLINIC | Age: 63
End: 2023-08-23

## 2023-08-23 DIAGNOSIS — C61 PROSTATE CANCER (H): Primary | ICD-10-CM

## 2023-08-29 ENCOUNTER — VIRTUAL VISIT (OUTPATIENT)
Dept: ONCOLOGY | Facility: HOSPITAL | Age: 63
End: 2023-08-29
Attending: PSYCHOLOGIST
Payer: COMMERCIAL

## 2023-08-29 DIAGNOSIS — F43.21 ADJUSTMENT DISORDER WITH DEPRESSED MOOD: Primary | ICD-10-CM

## 2023-08-29 PROCEDURE — 90834 PSYTX W PT 45 MINUTES: CPT | Mod: VID | Performed by: PSYCHOLOGIST
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
## 2023-09-01 ENCOUNTER — TRANSFERRED RECORDS (OUTPATIENT)
Dept: HEALTH INFORMATION MANAGEMENT | Facility: CLINIC | Age: 63
End: 2023-09-01

## 2023-09-12 NOTE — PROGRESS NOTES
"  FOLLOW UP VISIT    Reason for Visit:  Pelvic recurrence of prostate cancer post-RP, received salvage XRT plus ADT/abiraterone.    History of Present Illness:   Mr. Yang is a 63 year old man with a history of prostate cancer (see below) as well as esophageal adenocarcinoma (8/2013).  5/1/2018: Prostate biopsy: adenocarcinoma Jules 8 (4+4).  PSA was 12.92 ng/mL.  9/1/2022: Prostatectomy, pathologic evaluation demonstrated Jules 8 (4+4). 4 lymph nodes were noninvolved pT3a, pN0, pR0.  Persistent PSA elevation post-operatively.  10/6/2022: PSMA-PET demonstrated increased uptake in the prostate bed and bilateral pelvic lymph nodes.   Treated with salvage XRT plus ADT/abiraterone.    PSA level  6/15/2007 PSA 1.89  5/15/2013 PSA 4.79  6/2/2017 PSA 12.92  5/02/2019 PSA 20.77  12/12/2019 PSA 34.8  7/16/2020 PSA 34.63  1/27/2022 PSA 37.84  9/29/2022 PSA 13.21  1/2023 START Lupron + Zytiga  2/7/23  PSA = 6.49  ---Radiation Start  3/13/23 PSA = 1.7  3/24/23 Radiation Completed.   07/31/23        PSA <0.10, T undetectable  09/08/23 PSA <0.01    Review of Systems:   Has been holding Zytiga since 06/17/23. He had ankle, shoulder, and pain all on the right side. Along with a right sided headache. He found that with in one week his arthralgias improved. He has a long standing history of R shoulder pain feels this is better. His headaches have also improved. His last headache was last week. Energy is \"okay but room for improvement\". He feels his energy is still low. He is active around the yard and boat. He also takes daily naps. He endorses hot flashes- fluctuates in intensity. Blood pressure has been \"pretty good\". No urinary changes. No peripheral edema. Completed radiation in march. Last lupron on 07/24/23, was a 3-month dose. He re-started Zytiga 750 mg one month ago.    Mental health has been \"a struggle\". Started Wellbutrin early July with improvement. No suicidal thoughts. Felt a huge change in mood after stopping " abiraterone and wellbutrin. He has erectile dysfunction and would like a referral to urology through  as he has not found what his local urologist has suggested to be helpful.    A 14-point review of systems was performed and was all negative except for symptoms noted above in HPI.    PMHx:  Prostate cancer with pelvic recurrence s/p RP  Esophageal carcinoma ()    MEDICATIONS    Current Outpatient Medications   Medication Instructions      Lupron 22.5 mg subcutaneous, last dose 23 Receives this at [facility]    abiraterone (ZYTIGA) 750 mg, Oral, DAILY, Take on empty stomach.    Aloe LIQD Oral, DAILY    predniSONE (DELTASONE) 5 mg, Oral, 2 TIMES DAILY    sildenafil (REVATIO) 20 mg    vitamin D3 (CHOLECALCIFEROL) 50 mcg (2000 units) tablet 1 tablet, Oral, DAILY     Social Hx:  Occupation: , part-time   Former smoker, quit   Denies alcohol use  Denies Illicit drug use    Family Hx:  Father: , from unknown cancer (possible metastatic prostate)     PHYSICAL EXAMINATION  General: Patient appears well. Normal body habitus  HEENT: Normocephalic, atraumatic.   Cardiac: Regular rate and rhythm. No murmurs, rub, or extra heart sounds  Lungs: Clear to auscultation bilaterally  Abdomen: Normal bowel sounds. No tenderness to palpation    IMAGES  10/6/2022 - PSMA PET CT  1.  Abnormal radiotracer uptake within bilateral pelvic lymph nodes is compatible with metastatic disease.   2.  Abnormal radiotracer uptake at the site of prostatectomy and prior fluid collection is indeterminate. This is concerning for residual or recurrent disease, but the differential would include inflammatory findings related to the prior anastomotic leak and associated fluid collection.     PATHOLOGY 2022  Prostate, radical prostatectomy:  - Prostatic adenocarcinoma, acinar type, with extensive cribriform growth.  - Histologic grade: [place] score 4+4 = 8 (grade group 4).  - Extraprostatic extension  identified (nonfocal); no seminal vesicle invasion identified.  - Margins negative.  - Lymph nodes (pelvic):  0/4 involved  - AJCC pathologic stage:  pT3a pN0 pR0.      IMPRESSION AND PLAN  #Prostate cancer with pelvic recurrence s/p prostatectomy, now receiving salvage radiation plus ADT/abiraterone  - Recommended ADT plus abiraterone for 2 years. Patient had increased arthralgias, headaches, and worsened mood on 1,000 mg abiraterone. He has been holding since 06/17/23.  One month ago, he re-started abiraterone 750 mg.  - His PSA is undetectable at <0.10 with suppressed testosterone. We reviewed that this is the expected effect of ADT and abiraterone. Undetectable PSA is not a reason to stop therapies.   - Reviewed with local metastatic disease in the pelvis he is a candidate for both ADT plus abiraterone without bone metastasis per the STAMPEDE trial. Per the trial, men with locally advanced or metastatic prostate cancer who received ADT plus abiraterone had significantly higher rates of overall and failure-free survival than those who received ADT alone. I discussed these data with Davion today.   - I recommended considering restarting abiraterone at a dose reduction of 750 mg per day on an empty stomach given side effects at full dose. Davion will consider this and let me know what he decides.   - Continue ADT/abiraterone until December 2024.  - He has asked to further reduce his Abiraterone, and we have decided to recommend taking 500 mg daily.    #Erectile dysfunction  - has tried sildenafil and trimix. Discussed I refer patients to PCP or urology to help with management. Would like to establish with urology at [place].     #Mood  - continue wellbutrin per PCP   - continue therapy.     A total of 45 minutes were spent on the date of the encounter conducting chart review, review of test results, patient visit, and documentation.  The patient was given the opportunity to ask several questions today, all of  which were answered to his satisfaction.    Ulices Swan M.D.

## 2023-09-13 ENCOUNTER — ONCOLOGY VISIT (OUTPATIENT)
Dept: ONCOLOGY | Facility: CLINIC | Age: 63
End: 2023-09-13
Attending: INTERNAL MEDICINE
Payer: COMMERCIAL

## 2023-09-13 ENCOUNTER — PATIENT OUTREACH (OUTPATIENT)
Dept: ONCOLOGY | Facility: CLINIC | Age: 63
End: 2023-09-13
Payer: COMMERCIAL

## 2023-09-13 VITALS
HEART RATE: 76 BPM | WEIGHT: 227.6 LBS | HEIGHT: 69 IN | BODY MASS INDEX: 33.71 KG/M2 | OXYGEN SATURATION: 97 % | RESPIRATION RATE: 16 BRPM | TEMPERATURE: 97.8 F | SYSTOLIC BLOOD PRESSURE: 119 MMHG | DIASTOLIC BLOOD PRESSURE: 80 MMHG

## 2023-09-13 DIAGNOSIS — C61 MALIGNANT NEOPLASM OF PROSTATE (H): Primary | ICD-10-CM

## 2023-09-13 PROCEDURE — G0463 HOSPITAL OUTPT CLINIC VISIT: HCPCS | Performed by: INTERNAL MEDICINE

## 2023-09-13 PROCEDURE — 99215 OFFICE O/P EST HI 40 MIN: CPT | Performed by: INTERNAL MEDICINE

## 2023-09-13 RX ORDER — PREDNISOLONE 5 MG/1
5 TABLET ORAL DAILY
COMMUNITY
End: 2023-12-18

## 2023-09-13 ASSESSMENT — PAIN SCALES - GENERAL: PAINLEVEL: SEVERE PAIN (6)

## 2023-09-13 NOTE — PROGRESS NOTES
[facility]: Cancer Care                                                                                      RNCC introduced self and explained role.  Provided patient with contact information.  Answered questions.    Radha Elizabeth, RN, BSN  Oncology RN Care Coordinator  [facility]

## 2023-09-13 NOTE — PROGRESS NOTES
[facility]: Cancer Care Short Note                                                                                      Per Dr. Swan's request, completed requisition form sent to him for signature. Required radical prostatectomy (09/01/22) pathology report, demographic information, and visit notes uploaded with the requisition. All forms will be sent to Sim electronically when signature is complete.      Radha Elizabeth, RN, BSN  Oncology RN Care Coordinator  [facility]

## 2023-09-13 NOTE — LETTER
"    9/13/2023         RE: Davion Yang  [address] [place] [address]        Dear Colleague,    Thank you for referring your patient, Davion Yang, to the [facility]. Please see a copy of my visit note below.      FOLLOW UP VISIT    Reason for Visit:  Pelvic recurrence of prostate cancer post-RP, received salvage XRT plus ADT/abiraterone.    History of Present Illness:   Mr. Yang is a 63 year old man with a history of prostate cancer (see below) as well as esophageal adenocarcinoma (8/2013).  5/1/2018: Prostate biopsy: adenocarcinoma Jules 8 (4+4).  PSA was 12.92 ng/mL.  9/1/2022: Prostatectomy, pathologic evaluation demonstrated [place] 8 (4+4). 4 lymph nodes were noninvolved pT3a, pN0, pR0.  Persistent PSA elevation post-operatively.  10/6/2022: PSMA-PET demonstrated increased uptake in the prostate bed and bilateral pelvic lymph nodes.   Treated with salvage XRT plus ADT/abiraterone.    PSA level  6/15/2007 PSA 1.89  5/15/2013 PSA 4.79  6/2/2017 PSA 12.92  5/02/2019 PSA 20.77  12/12/2019 PSA 34.8  7/16/2020 PSA 34.63  1/27/2022 PSA 37.84  9/29/2022 PSA 13.21  1/2023 START Lupron + Zytiga  2/7/23  PSA = 6.49  ---Radiation Start  3/13/23 PSA = 1.7  3/24/23 Radiation Completed.   07/31/23        PSA <0.10, T undetectable  09/08/23 PSA <0.01    Review of Systems:   Has been holding Zytiga since 06/17/23. He had ankle, shoulder, and pain all on the right side. Along with a right sided headache. He found that with in one week his arthralgias improved. He has a long standing history of R shoulder pain feels this is better. His headaches have also improved. His last headache was last week. Energy is \"okay but room for improvement\". He feels his energy is still low. He is active around the yard and boat. He also takes daily naps. He endorses hot flashes- fluctuates in intensity. Blood pressure has been \"pretty good\". No urinary changes. No peripheral edema. Completed radiation in " "march. Last lupron on 23, was a 3-month dose. He re-started Zytiga 750 mg one month ago.    Mental health has been \"a struggle\". Started Wellbutrin early July with improvement. No suicidal thoughts. Felt a huge change in mood after stopping abiraterone and wellbutrin. He has erectile dysfunction and would like a referral to urology through  as he has not found what his local urologist has suggested to be helpful.    A 14-point review of systems was performed and was all negative except for symptoms noted above in HPI.    PMHx:  Prostate cancer with pelvic recurrence s/p RP  Esophageal carcinoma ()    MEDICATIONS    Current Outpatient Medications   Medication Instructions      Lupron 22.5 mg subcutaneous, last dose 23 Receives this at [facility]    abiraterone (ZYTIGA) 750 mg, Oral, DAILY, Take on empty stomach.    Aloe LIQD Oral, DAILY    predniSONE (DELTASONE) 5 mg, Oral, 2 TIMES DAILY    sildenafil (REVATIO) 20 mg    vitamin D3 (CHOLECALCIFEROL) 50 mcg (2000 units) tablet 1 tablet, Oral, DAILY     Social Hx:  Occupation: , part-time   Former smoker, quit   Denies alcohol use  Denies Illicit drug use    Family Hx:  Father: , from unknown cancer (possible metastatic prostate)     PHYSICAL EXAMINATION  General: Patient appears well. Normal body habitus  HEENT: Normocephalic, atraumatic.   Cardiac: Regular rate and rhythm. No murmurs, rub, or extra heart sounds  Lungs: Clear to auscultation bilaterally  Abdomen: Normal bowel sounds. No tenderness to palpation    IMAGES  10/6/2022 - PSMA PET CT  1.  Abnormal radiotracer uptake within bilateral pelvic lymph nodes is compatible with metastatic disease.   2.  Abnormal radiotracer uptake at the site of prostatectomy and prior fluid collection is indeterminate. This is concerning for residual or recurrent disease, but the differential would include inflammatory findings related to the prior anastomotic leak and associated " fluid collection.     PATHOLOGY 9-1-2022  Prostate, radical prostatectomy:  - Prostatic adenocarcinoma, acinar type, with extensive cribriform growth.  - Histologic grade: [place] score 4+4 = 8 (grade group 4).  - Extraprostatic extension identified (nonfocal); no seminal vesicle invasion identified.  - Margins negative.  - Lymph nodes (pelvic):  0/4 involved  - AJCC pathologic stage:  pT3a pN0 pR0.      IMPRESSION AND PLAN  #Prostate cancer with pelvic recurrence s/p prostatectomy, now receiving salvage radiation plus ADT/abiraterone  - Recommended ADT plus abiraterone for 2 years. Patient had increased arthralgias, headaches, and worsened mood on 1,000 mg abiraterone. He has been holding since 06/17/23.  One month ago, he re-started abiraterone 750 mg.  - His PSA is undetectable at <0.10 with suppressed testosterone. We reviewed that this is the expected effect of ADT and abiraterone. Undetectable PSA is not a reason to stop therapies.   - Reviewed with local metastatic disease in the pelvis he is a candidate for both ADT plus abiraterone without bone metastasis per the STAMPEDE trial. Per the trial, men with locally advanced or metastatic prostate cancer who received ADT plus abiraterone had significantly higher rates of overall and failure-free survival than those who received ADT alone. I discussed these data with Davion today.   - I recommended considering restarting abiraterone at a dose reduction of 750 mg per day on an empty stomach given side effects at full dose. Davion will consider this and let me know what he decides.   - Continue ADT/abiraterone until December 2024.  - He has asked to further reduce his Abiraterone, and we have decided to recommend taking 500 mg daily.    #Erectile dysfunction  - has tried sildenafil and trimix. Discussed I refer patients to PCP or urology to help with management. Would like to establish with urology at [place].     #Mood  - continue wellbutrin per PCP   - continue  therapy.     A total of 45 minutes were spent on the date of the encounter conducting chart review, review of test results, patient visit, and documentation.  The patient was given the opportunity to ask several questions today, all of which were answered to his satisfaction.    Ulices Swan M.D.

## 2023-09-13 NOTE — NURSING NOTE
"Oncology Rooming Note    September 13, 2023 12:50 PM   Davion Yang is a 63 year old male who presents for:    Chief Complaint   Patient presents with    Prostate Cancer     Initial Vitals: /80 (BP Location: Right arm)   Pulse 76   Temp 97.8  F (36.6  C) (Oral)   Resp 16   Ht 1.763 m (5' 9.39\")   Wt 103.2 kg (227 lb 9.6 oz)   SpO2 97%   BMI 33.23 kg/m   Estimated body mass index is 33.23 kg/m  as calculated from the following:    Height as of this encounter: 1.763 m (5' 9.39\").    Weight as of this encounter: 103.2 kg (227 lb 9.6 oz). Body surface area is 2.25 meters squared.  Severe Pain (6) Comment: Data Unavailable   No LMP for male patient.  Allergies reviewed: Yes  Medications reviewed: Yes    Medications: Medication refills not needed today.  Pharmacy name entered into [facility]:    [facility] - [address] [facility] - [place] [address] [address] [facility] [address] AT [facility] - [place] [address] [address]     Clinical concerns: none       Kaylah Ramires              "

## 2023-09-15 ENCOUNTER — PATIENT OUTREACH (OUTPATIENT)
Dept: ONCOLOGY | Facility: CLINIC | Age: 63
End: 2023-09-15
Payer: COMMERCIAL

## 2023-09-15 NOTE — PROGRESS NOTES
[facility]: Cancer Care                                                                                      Lab orders faxed to Shamika Rodriguez per patient request.  Orders are for December.  Included request to fax results back.    Radha Elizabeth, RN, BSN  Oncology RN Care Coordinator  [facility]

## 2023-09-17 DIAGNOSIS — C61 PROSTATE CANCER (H): Primary | ICD-10-CM

## 2023-09-17 DIAGNOSIS — C77.9 REGIONAL LYMPH NODE METASTASIS PRESENT (H): ICD-10-CM

## 2023-09-18 ENCOUNTER — TELEPHONE (OUTPATIENT)
Dept: ONCOLOGY | Facility: CLINIC | Age: 63
End: 2023-09-18
Payer: COMMERCIAL

## 2023-09-18 DIAGNOSIS — C61 PROSTATE CANCER (H): Primary | ICD-10-CM

## 2023-09-18 DIAGNOSIS — C77.9 REGIONAL LYMPH NODE METASTASIS PRESENT (H): ICD-10-CM

## 2023-09-18 RX ORDER — ABIRATERONE ACETATE 250 MG/1
500 TABLET ORAL DAILY
Qty: 60 TABLET | Refills: 0 | Status: SHIPPED | OUTPATIENT
Start: 2023-09-18 | End: 2023-10-18

## 2023-09-18 RX ORDER — PREDNISONE 5 MG/1
5 TABLET ORAL 2 TIMES DAILY
Qty: 60 TABLET | Refills: 0 | Status: SHIPPED | OUTPATIENT
Start: 2023-09-18 | End: 2023-10-16

## 2023-09-18 NOTE — TELEPHONE ENCOUNTER
Oral Chemotherapy Monitoring Program     Placed call to patient in follow up of oral chemotherapy. Left message requesting call back. No drug names were mentioned. Will update when response received.     Grace Myhre, PharmD  Hematology/Oncology Clinical Pharmacist  [facility]  [facility]  [phone]

## 2023-09-20 NOTE — TELEPHONE ENCOUNTER
Oral Chemotherapy Monitoring Program    Subjective/Objective:  Davion Yang is a 63 year old male contacted by phone for a follow-up visit for oral chemotherapy.  Wife, Gi,  confirms Davion is taking the appropriate dose of Zytiga 500mg (3p631lo tablets) once daily on an empty stomach. Denies missed doses, medication changes, and recent hospital or ED visits.  Gi did share that Davion experienced one headache this week that went all of the way around the back of his head.  Usually he gets headaches a couple of times per week but only on the back right side.  The headache did subside and has not reoccurred.  She also shares he experienced some knee pain in both knees on Monday and Tuesday this week, but states it is much better today.            8/10/2023    11:00 AM 8/14/2023     3:00 PM 9/14/2023     9:00 AM 9/18/2023    10:00 AM 9/18/2023     1:00 PM 9/20/2023    11:00 AM 9/20/2023    11:47 AM   ORAL CHEMOTHERAPY   Assessment Type Monthly Follow up;Refill Other Chart Review Refill Left Voicemail Left Voicemail Monthly Follow up   Diagnosis Code Prostate Cancer Prostate Cancer Prostate Cancer Prostate Cancer Prostate Cancer Prostate Cancer Prostate Cancer   Providers Dr. Arun Swan   [facility]   Is this patient followed by the [facility] OC team? Yes Yes Yes Yes Yes Yes Yes   Drug Name Zytiga (abiraterone) Zytiga (abiraterone) Zytiga (abiraterone) Zytiga (abiraterone) Zytiga (abiraterone) Zytiga (abiraterone) Zytiga (abiraterone)   Dose 750 mg 750 mg 500 mg 500 mg 500 mg 500 mg 500 mg   Current Schedule Daily Daily Daily Daily Daily Daily Daily   Cycle Details Continuous Continuous Continuous Continuous Continuous Continuous Continuous   Planned next cycle start date 8/14/2023 8/14/2023        Doses missed in last 2 weeks 0      0   Adherence Assessment Adherent      " Adherent   Adverse Effects No AE identified during assessment      Headache;Myalgias/Arthralgias;Increased AST/ALT/T BILI   Myalgias/Arthralgias       Grade 1   Pharmacist Intervention(myalgias/arthralgias)       No   Headache       Grade 1   Pharmacist Intervention(headache)       No   Increased AST/ALT/T BILI       Grade 1   Pharmacist Intervention(increased ast/alt/t bili)       No   Any new drug interactions? No      No   Is the dose as ordered appropriate for the patient? Yes      Yes   Since the last time we talked, have you been hospitalized or used the emergency room? No      No       Last PHQ-2 Score on record:        No data to display                Vitals:  BP:   BP Readings from Last 1 Encounters:   09/13/23 119/80     Wt Readings from Last 1 Encounters:   09/13/23 103.2 kg (227 lb 9.6 oz)     Estimated body surface area is 2.25 meters squared as calculated from the following:    Height as of 9/13/23: 1.763 m (5' 9.39\").    Weight as of 9/13/23: 103.2 kg (227 lb 9.6 oz).    Labs:  No results found for NA within last 30 days.     No results found for K within last 30 days.     No results found for CA within last 30 days.     No results found for Mag within last 30 days.     No results found for Phos within last 30 days.     No results found for ALBUMIN within last 30 days.     No results found for BUN within last 30 days.     No results found for CR within last 30 days.     No results found for AST within last 30 days.     No results found for ALT within last 30 days.     No results found for BILITOTAL within last 30 days.     No results found for WBC within last 30 days.     No results found for HGB within last 30 days.     No results found for PLT within last 30 days.     No results found for ANC within last 30 days.     No results found for ANC within last 30 days.          Assessment/Plan:  Davion is tolerating the lower dose (500 mg) of Zytiga fairly well other than a headache that went around the " back of his head and some bilateral knee pain which have both resolved.  Will plan to continue Zytiga.      Follow-Up:  9/21: Appt with Kyle Lewis  10/23: Monthly assessment.    Refill Due:  [facility] to deliver medication on 9/22/23.    Deborah Oneill, PharmD  Hematology/Oncology Clinical Pharmacist  [facility]  [phone]

## 2023-09-20 NOTE — TELEPHONE ENCOUNTER
Oral Chemotherapy Monitoring Program     Placed call to Davion Yang in follow up of Zytiga oral chemotherapy.     Left a message requesting a call back. No drug names were mentioned in the voicemail. Will update when response is received.    Deborah Oneill, JacobD  Oral Chemotherapy Monitoring Program  [facility]  [phone]  September 20, 2023

## 2023-09-28 ENCOUNTER — VIRTUAL VISIT (OUTPATIENT)
Dept: ONCOLOGY | Facility: HOSPITAL | Age: 63
End: 2023-09-28
Attending: PSYCHOLOGIST
Payer: COMMERCIAL

## 2023-09-28 DIAGNOSIS — F43.21 ADJUSTMENT DISORDER WITH DEPRESSED MOOD: Primary | ICD-10-CM

## 2023-09-28 PROCEDURE — 90834 PSYTX W PT 45 MINUTES: CPT | Mod: VID | Performed by: PSYCHOLOGIST

## 2023-09-28 NOTE — PROGRESS NOTES
[facility] Oncology- Psychotherapy                                                 Progress Note                 Patient Name: Davion Yang                     Date:   2023                                              Service Type: Individual                            Session Start Time:  1100                Session End Time:    1149                Session Length:        49 mins     Attendees:     Client attended alone                Service Modality:  Video Visit:      Provider verified identity through the following two step process.  Patient provided:  Patient      Telemedicine Visit: The patient's condition can be safely assessed and treated via synchronous audio and visual telemedicine encounter.       Reason for Telemedicine Visit: Patient has requested telehealth visit     Originating Site (Patient Location): Patient's home     Distant Site (Provider Location): [facility] [place]     Consent:  The patient/guardian has verbally consented to: the potential risks and benefits of telemedicine (video visit) versus in person care; bill my insurance or make self-payment for services provided; and responsibility for payment of non-covered services.      Patient would like the video invitation sent by:  My Chart     Mode of Communication:  Video Conference via Amwell     Distant Location (Provider):  On-site     As the provider I attest to compliance with applicable laws and regulations related to telemedicine.                    DATA              Interactive Complexity: No              Crisis: No                                 Progress Since Last Session (Related to Symptoms / Goals / Homework):  Symptoms:      ONGOING: Mood is variable, periods of sadness.     Pt reports fatigue. He does not feel as strong as he would like.      Pt reports he is doing projects to stay busy.      Pt reports he is eating well, slight anxiety, fear of things  "not working out.      Pt reports he has been sleeping better. He has been taking a sleep supplement that has helped.      Pt reports he had radiation this spring. He is on some cancer medicine. Pt reports joint pain and it was stopped for a while and will be restarted soon. He reports he is feeling pretty good.         Homework:  pt recommended to use CALM watson or You Tube to find breathing and meditations.                               Episode of Care Goals: Satisfactory progress -Action                  Current / Ongoing Stressors and Concerns:  Pt reports he is having good and bad days with Gi. Pts ex- sent a video of pt and her having sex and since then things have been tough between pt and Gi.     Pt reports he is working on the divorce and that process is ongoing.     Gi does want pt talking to Brit . Pt wants to talk to her about the roof and siding on one wall.       ONGOING: pts wife made claim on her house. Also, pt reports she is moving money around. Pt is trying to manage that situation.     Pt has a meeting tomorrow with his  about the divorce.      ONGOING: Pt reports he wants to connect with his kids again. He has tried to contact them.     Elizabeth (19) is talking to him. She said she wanted to stay in touch. She has not responded to pts recent reaching out.     Alejandrina (28)  Pt may have contact with her. She sent a text a while ago asking to have contact.     Pts son Modesto (23) does not talk a lot and pt needs to spend time with him to have contact. He is also busy working 50-70 hours per week. He bought a house recently also and is busy.    Pt reports \"Kathy\" (34) has not had contact with pt in a long time. 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 A week ago she sent pt a nasty text.  Her spouse is a problem for pt.                   Social Hx     Pt was  X 35 years and .     Pt lives with S/O for the past year. She was  a few weeks ago (7/2023) .      Pt has 4 " "adult children: 19 daughter \"Elizabeth\" , 23 son \"Modesto\", 28 \"Alejandrina\" , and 34 \"Kathy\" years olds. They are mad at him for leaving their mother. Pt was seeing Gi while he was with his wife. Pt reports all of his kids live in the area.      Pt reports a hx of working in the [place] area. Pt reports he has been working since he was 11. He reports his parents are . A brother  and a sister . He has a sister who is living but has no contact with her.      Pt reports when his brother  his sister did not tell him.. There was no reason given.      Pt reports he first had cancer 10 years and within the past two years had prostate surgery after prostate cancer dx.      Pt denies CD issues and last drank alcohol 36 years ago.      Pt denies a hx of MH issues.                  Treatment Objective(s) Addressed in This Session:          identify family and medical  stressors which contribute to feelings of depression  .                 Intervention:              CBT: ,              Solution Focused: ,                             ASSESSMENT: Current Emotional / Mental Status (status of significant symptoms):              Risk status (Self / Other harm or suicidal ideation)              Patient denies current fears or concerns for personal safety.              Patient denies current or recent suicidal ideation or behaviors.              Patient denies current or recent homicidal ideation or behaviors.              Patient denies current or recent self injurious behavior or ideation.              Patient denies other safety concerns.              Patient reports there has been no change in risk factors since their last session.                Patient reports there has been no change in protective factors since their last session.                Recommended that patient call 911 or go to the local ED should there be a change in any of these risk factors.                 Appearance:                            " Appropriate               Eye Contact:                           Good               Psychomotor Behavior:          Normal               Attitude:                                   Cooperative               Orientation:                             All              Speech                          Rate / Production:       Normal                           Volume:                       Normal               Mood:                                      Anxious  variable mood              Affect:                                      Appropriate               Thought Content:                    Clear               Thought Form:                        Coherent  Logical               Insight:                                     Good                  Medication Review:              No changes to current psychiatric medication(s)                 Medication Compliance:              Yes                 Changes in Health Issues:              None reported                 Chemical Use Review:              Substance Use: Chemical use reviewed, no active concerns identified                  Tobacco Use: No current tobacco use.       Diagnosis:  1. Depression    F43.21 Adjusment d/o with depressive sx.      Collateral Reports Completed:              Not Applicable                 PLAN: (Patient Tasks / Therapist Tasks / Other)              Return as needed, obtain CALM watson and use coping strategies of breathing exercises and meditation.                        Kyle Lewis LP                                9/28/23                                                           ______________________________________________________________________     Individual Treatment Plan     Patient's Name: Davion Yang                  YOB: 1960     Date of Creation: 7/10/2023  Date Treatment Plan Last Reviewed/Revised: 9/28/2023        DSM5 Diagnoses: F43.21 adjustment disorder with depressive sx.   Psychosocial / Contextual  Factors: Family and medical stress.   PROMIS (reviewed every 90 days):      Referral / Collaboration:  Referral to another professional/service is not indicated at this time..     Anticipated number of session for this episode of care: 9-12 sessions  Anticipation frequency of session: Biweekly  Anticipated Duration of each session: 53 or more minutes  Treatment plan will be reviewed in 90 days or when goals have been changed.         MeasurableTreatment Goal(s) related to diagnosis / functional impairment(s)  Goal 1: Patient will reduce depressive sx     Objective #A (Patient Action)                          Patient will identify medical and family stressors which contribute to feelings of depression.  Status: Reviewed - Date: 9/28/23      Intervention(s)  Therapist will teach emotional regulation skills. ,.     Objective #B  Patient will identify medical and family  stressors which contribute to feelings of depression.  Status: Reviewed - Date: 9/28/23      Intervention(s)  Therapist will teach emotional recognition/identification. ,.     Objective #C  Patient will identify medical and family  stressors which contribute to feelings of depression.  Status: Reviewed - Date: 9/28/23      Intervention(s)  Therapist will teach distraction skills. ,.        Patient has reviewed and agreed to the above plan.        Kyle Lewis LP                    9/28/2023

## 2023-10-02 ENCOUNTER — DOCUMENTATION ONLY (OUTPATIENT)
Dept: ONCOLOGY | Facility: CLINIC | Age: 63
End: 2023-10-02
Payer: COMMERCIAL

## 2023-10-02 NOTE — PROGRESS NOTES
[facility] Report (9/29/2023):     TMPRSS2-ERG  fusion   CTNNB1  p.T41A   CDKN1B  loss     MS - stable   TMB 3 muts/Mb   gLOH 6%   PD-L1 0%

## 2023-10-09 ENCOUNTER — MYC MEDICAL ADVICE (OUTPATIENT)
Dept: ONCOLOGY | Facility: CLINIC | Age: 63
End: 2023-10-09
Payer: COMMERCIAL

## 2023-10-09 NOTE — TELEPHONE ENCOUNTER
Oral Chemotherapy Monitoring Program  Lab Follow Up    Reviewed CMP lab results from 10/6 in [facility].        8/14/2023     3:00 PM 9/14/2023     9:00 AM 9/18/2023    10:00 AM 9/18/2023     1:00 PM 9/20/2023    11:00 AM 9/20/2023    11:47 AM 10/9/2023    11:00 AM   ORAL CHEMOTHERAPY   Assessment Type Other Chart Review Refill Left Voicemail Left Voicemail Monthly Follow up Lab Monitoring   Diagnosis Code Prostate Cancer Prostate Cancer Prostate Cancer Prostate Cancer Prostate Cancer Prostate Cancer Prostate Cancer   Providers Dr. Arun Swan   [facility]   Is this patient followed by the [facility] OC team? Yes Yes Yes Yes Yes Yes Yes   Drug Name Zytiga (abiraterone) Zytiga (abiraterone) Zytiga (abiraterone) Zytiga (abiraterone) Zytiga (abiraterone) Zytiga (abiraterone) Zytiga (abiraterone)   Dose 750 mg 500 mg 500 mg 500 mg 500 mg 500 mg 500 mg   Current Schedule Daily Daily Daily Daily Daily Daily Daily   Cycle Details Continuous Continuous Continuous Continuous Continuous Continuous Continuous   Planned next cycle start date 8/14/2023         Doses missed in last 2 weeks      0    Adherence Assessment      Adherent    Adverse Effects      Headache;Myalgias/Arthralgias;Increased AST/ALT/T BILI    Myalgias/Arthralgias      Grade 1    Pharmacist Intervention(myalgias/arthralgias)      No    Headache      Grade 1    Pharmacist Intervention(headache)      No    Increased AST/ALT/T BILI      Grade 1    Pharmacist Intervention(increased ast/alt/t bili)      No    Any new drug interactions?      No    Is the dose as ordered appropriate for the patient?      Yes    Since the last time we talked, have you been hospitalized or used the emergency room?      No        Labs:  No results found for NA within last 30 days.     No results found for K within last 30 days.     No results  found for CA within last 30 days.     No results found for Mag within last 30 days.     No results found for Phos within last 30 days.     No results found for ALBUMIN within last 30 days.     No results found for BUN within last 30 days.     No results found for CR within last 30 days.     No results found for AST within last 30 days.     No results found for ALT within last 30 days.     No results found for BILITOTAL within last 30 days.     No results found for WBC within last 30 days.     No results found for HGB within last 30 days.     No results found for PLT within last 30 days.     No results found for ANC within last 30 days.     No results found for ANC within last 30 days.        Assessment & Plan:  No concerning abnormalities. Continue abiraterone as planned    MyChart sent to patient with results    Follow-Up:  10/23: monthly assessment    Grace Myhre, PharmD  Hematology/Oncology Pharmacist  [facility]  [facility]  [phone]

## 2023-10-16 ENCOUNTER — TELEPHONE (OUTPATIENT)
Dept: ONCOLOGY | Facility: CLINIC | Age: 63
End: 2023-10-16
Payer: COMMERCIAL

## 2023-10-16 DIAGNOSIS — C77.9 REGIONAL LYMPH NODE METASTASIS PRESENT (H): ICD-10-CM

## 2023-10-16 DIAGNOSIS — C61 PROSTATE CANCER (H): Primary | ICD-10-CM

## 2023-10-16 RX ORDER — PREDNISONE 5 MG/1
5 TABLET ORAL 2 TIMES DAILY
Qty: 60 TABLET | Refills: 0 | Status: SHIPPED | OUTPATIENT
Start: 2023-10-16 | End: 2023-11-14

## 2023-10-16 RX ORDER — ABIRATERONE ACETATE 250 MG/1
500 TABLET ORAL DAILY
Qty: 60 TABLET | Refills: 0 | Status: SHIPPED | OUTPATIENT
Start: 2023-10-16 | End: 2023-11-14

## 2023-10-16 NOTE — TELEPHONE ENCOUNTER
Oral Chemotherapy Monitoring Program     Placed call to patient in follow up of oral chemotherapy. Left message requesting call back. No drug names were mentioned. Will update when response received.     Grace Myhre, PharmD  Hematology/Oncology Clinical Pharmacist  [facility]  [facility]  [phone]

## 2023-10-18 NOTE — TELEPHONE ENCOUNTER
"Oral Chemotherapy Monitoring Program    Subjective/Objective:  Davion Yang is a 63 year old male contacted by phone for a follow-up visit for oral chemotherapy.  Spoke with pt's wife, Gi, who confirms pt is taking the appropriate dose of Abiraterone, 4x001at daily on an empty stomach.  She states t has been taking prednisone 5mg once daily (rx is for twice daily) Denies new or worsening side effects, missed doses, and recent hospital or ED visits. Patient has not had any recent medication changes. Pt's wife states pt is feeling \"much better\" on this reduced dose of abiraterone, and has not been having myalgias and headaches recently.    Next labs scheduled for 10/26 at [facility].        9/18/2023     1:00 PM 9/20/2023    11:00 AM 9/20/2023    11:47 AM 10/9/2023    11:00 AM 10/16/2023    10:00 AM 10/16/2023     4:00 PM 10/18/2023    12:00 PM   ORAL CHEMOTHERAPY   Assessment Type Left Voicemail Left Voicemail Monthly Follow up Lab Monitoring Refill Left Voicemail Monthly Follow up   Diagnosis Code Prostate Cancer Prostate Cancer Prostate Cancer Prostate Cancer Prostate Cancer Prostate Cancer Prostate Cancer   Providers Dr. Sosa Swan   [facility]   Is this patient followed by the [facility] OC team? Yes Yes Yes Yes Yes Yes Yes   Drug Name Zytiga (abiraterone) Zytiga (abiraterone) Zytiga (abiraterone) Zytiga (abiraterone) Zytiga (abiraterone) Zytiga (abiraterone) Zytiga (abiraterone)   Dose 500 mg 500 mg 500 mg 500 mg 500 mg 500 mg 500 mg   Current Schedule Daily Daily Daily Daily Daily Daily Daily   Cycle Details Continuous Continuous Continuous Continuous Continuous Continuous Continuous   Doses missed in last 2 weeks   0    0   Adherence Assessment   Adherent    Adherent   Adverse Effects   Headache;Myalgias/Arthralgias;Increased AST/ALT/T BILI    No AE " "identified during assessment   Myalgias/Arthralgias   Grade 1       Pharmacist Intervention(myalgias/arthralgias)   No       Headache   Grade 1       Pharmacist Intervention(headache)   No       Increased AST/ALT/T BILI   Grade 1       Pharmacist Intervention(increased ast/alt/t bili)   No       Any new drug interactions?   No    No   Is the dose as ordered appropriate for the patient?   Yes    Yes   Since the last time we talked, have you been hospitalized or used the emergency room?   No    No       Last PHQ-2 Score on record:        No data to display                Vitals:  BP:   BP Readings from Last 1 Encounters:   09/13/23 119/80     Wt Readings from Last 1 Encounters:   09/13/23 103.2 kg (227 lb 9.6 oz)     Estimated body surface area is 2.25 meters squared as calculated from the following:    Height as of 9/13/23: 1.763 m (5' 9.39\").    Weight as of 9/13/23: 103.2 kg (227 lb 9.6 oz).    Labs:  No results found for NA within last 30 days.     No results found for K within last 30 days.     No results found for CA within last 30 days.     No results found for Mag within last 30 days.     No results found for Phos within last 30 days.     No results found for ALBUMIN within last 30 days.     No results found for BUN within last 30 days.     No results found for CR within last 30 days.     No results found for AST within last 30 days.     No results found for ALT within last 30 days.     No results found for BILITOTAL within last 30 days.     No results found for WBC within last 30 days.     No results found for HGB within last 30 days.     No results found for PLT within last 30 days.     No results found for ANC within last 30 days.     No results found for ANC within last 30 days.          Assessment/Plan:  Pt is tolerating Abiraterone 500mg daily on an empty stomach, Continue therapy as planned.  Advised pt's wife to confirm how pt is taking prednisone, and to try to take it as prescribed. Pt's wife " verbalized understanding.    Follow-Up:  10/26: monthly labs    Refill Due:  SP to deliver abiraterone 10/20 (does not need pred refill).    Grace Myhre, PharmD  Hematology/Oncology Pharmacist  [facility]  [facility]  [phone]

## 2023-10-19 ENCOUNTER — VIRTUAL VISIT (OUTPATIENT)
Dept: ONCOLOGY | Facility: HOSPITAL | Age: 63
End: 2023-10-19
Attending: PSYCHOLOGIST
Payer: COMMERCIAL

## 2023-10-19 DIAGNOSIS — F43.21 ADJUSTMENT DISORDER WITH DEPRESSED MOOD: Primary | ICD-10-CM

## 2023-10-19 PROCEDURE — 90832 PSYTX W PT 30 MINUTES: CPT | Mod: 95 | Performed by: PSYCHOLOGIST

## 2023-10-19 NOTE — PROGRESS NOTES
[facility] Oncology- Psychotherapy                                                 Progress Note                 Patient Name: Davion Yang                     Date:   10/19/2023                                              Service Type: Individual                            Session Start Time:  1100                Session End Time:    1133                Session Length:        33 mins     Attendees:     Client attended alone                 Service Modality:  Video Visit:      Provider verified identity through the following two step process.  Patient provided:  Patient      Telemedicine Visit: The patient's condition can be safely assessed and treated via synchronous audio and visual telemedicine encounter.       Reason for Telemedicine Visit: Patient has requested telehealth visit     Originating Site (Patient Location): Patient's home     Distant Site (Provider Location): [facility] [place]     Consent:  The patient/guardian has verbally consented to: the potential risks and benefits of telemedicine (video visit) versus in person care; bill my insurance or make self-payment for services provided; and responsibility for payment of non-covered services.      Patient would like the video invitation sent by:  My Chart     Mode of Communication:  Video Conference via Amwell     Distant Location (Provider):  On-site     As the provider I attest to compliance with applicable laws and regulations related to telemedicine.                    DATA              Interactive Complexity: No              Crisis: No                                 Progress Since Last Session (Related to Symptoms / Goals / Homework):  Symptoms: Mood is variable, periods of sadness.    Pt reports he feels depressed over his kids not talking to him.      Pt reports fatigue.      Pt reports he is eating well, slight anxiety, fear of things not working out.      Pt reports he has struggled  "with sleep at times.      Pt reports he is doing a testosterone blocker.                               Episode of Care Goals: Satisfactory progress -Action                  Current / Ongoing Stressors and Concerns:  Pt reports he needs to sign his divorce papers today.     Pt has people helping his soon ex-wife with the house projects planned. He is arranging for people to help her. Pt reports his wife gets the house and he gets some land.     Pt reports he is having good and bad days with Gi. Pt reports sometimes it is a struggle.         Pt reports he wants to connect with his kids again. He has tried to contact them. His son Modesto has talked with pt recently.      Elizabeth (19) is talking to him. She said she wanted to stay in touch. Again, She has not responded to pts recent reaching out.      Alejandrina (28)  Pt may have contact with her. She sent a text a while ago asking to have contact.      Pts son Modesto (23) does not talk a lot and pt needs to spend time with him to have contact. He is also busy working 50-70 hours per week. He bought a house recently also and is busy. Pt reports he talked with pt when his truck broke down.      Pt reports \"Kathy\" (34) has not had contact with pt in a long time.                   Social Hx     Pt was  X 35 years and .      Pt lives with S/O for the past year. She was  a few weeks ago (2023) .   Pt has 4 adult children: 19 daughter \"Elizabeth\" , 23 son \"Modesto\", 28 \"Alejandrina\" , and 34 \"Kathy\" years olds. They are mad at him for leaving their mother. Pt was seeing Gi while he was with his wife. Pt reports all of his kids live in the area.      Pt reports a hx of working in the [place] area. Pt reports he has been working since he was 11. He reports his parents are . A brother  and a sister . He has a sister who is living but has no contact with her.      Pt reports when his brother  his sister did not tell him.. There was no " reason given.      Pt reports he first had cancer 10 years and within the past two years had prostate surgery after prostate cancer dx.      Pt denies CD issues and last drank alcohol 36 years ago.      Pt denies a hx of MH issues.                      Treatment Objective(s) Addressed in This Session:          identify family and medical  stressors which contribute to feelings of depression  .                 Intervention:              CBT: ,              Solution Focused: ,                             ASSESSMENT: Current Emotional / Mental Status (status of significant symptoms):              Risk status (Self / Other harm or suicidal ideation)              Patient denies current fears or concerns for personal safety.              Patient denies current or recent suicidal ideation or behaviors.              Patient denies current or recent homicidal ideation or behaviors.              Patient denies current or recent self injurious behavior or ideation.              Patient denies other safety concerns.              Patient reports there has been no change in risk factors since their last session.                Patient reports there has been no change in protective factors since their last session.                Recommended that patient call 911 or go to the local ED should there be a change in any of these risk factors.                 Appearance:                            Appropriate               Eye Contact:                           Good               Psychomotor Behavior:          Normal               Attitude:                                   Cooperative               Orientation:                             All              Speech                          Rate / Production:       Normal                           Volume:                       Normal               Mood:                                      Anxious , low mood              Affect:                                      Appropriate                Thought Content:                    Clear               Thought Form:                        Coherent  Logical               Insight:                                     Good                                Changes in Health Issues:              None reported                 Chemical Use Review:              Substance Use: Chemical use reviewed, no active concerns identified                  Tobacco Use: No current tobacco use.       Diagnosis:  1.    F43.21 Adjusment d/o with depressive sx.      Collateral Reports Completed:              Not Applicable                 PLAN: (Patient Tasks / Therapist Tasks / Other)              Return as needed,                  Kyle Lewis LP                                9/28/23

## 2023-10-27 ENCOUNTER — MYC MEDICAL ADVICE (OUTPATIENT)
Dept: ONCOLOGY | Facility: CLINIC | Age: 63
End: 2023-10-27
Payer: COMMERCIAL

## 2023-10-27 NOTE — TELEPHONE ENCOUNTER
Oral Chemotherapy Monitoring Program  Lab Follow Up    Reviewed lab results from 10/26/23 in CE.        9/18/2023     1:00 PM 9/20/2023    11:00 AM 9/20/2023    11:47 AM 10/9/2023    11:00 AM 10/16/2023    10:00 AM 10/16/2023     4:00 PM 10/18/2023    12:00 PM   ORAL CHEMOTHERAPY   Assessment Type Left Voicemail Left Voicemail Monthly Follow up Lab Monitoring Refill Left Voicemail Monthly Follow up   Diagnosis Code Prostate Cancer Prostate Cancer Prostate Cancer Prostate Cancer Prostate Cancer Prostate Cancer Prostate Cancer   Providers Dr. Sosa Swan   [facility]   Is this patient followed by the [facility] OC team? Yes Yes Yes Yes Yes Yes Yes   Drug Name Zytiga (abiraterone) Zytiga (abiraterone) Zytiga (abiraterone) Zytiga (abiraterone) Zytiga (abiraterone) Zytiga (abiraterone) Zytiga (abiraterone)   Dose 500 mg 500 mg 500 mg 500 mg 500 mg 500 mg 500 mg   Current Schedule Daily Daily Daily Daily Daily Daily Daily   Cycle Details Continuous Continuous Continuous Continuous Continuous Continuous Continuous   Doses missed in last 2 weeks   0    0   Adherence Assessment   Adherent    Adherent   Adverse Effects   Headache;Myalgias/Arthralgias;Increased AST/ALT/T BILI    No AE identified during assessment   Myalgias/Arthralgias   Grade 1       Pharmacist Intervention(myalgias/arthralgias)   No       Headache   Grade 1       Pharmacist Intervention(headache)   No       Increased AST/ALT/T BILI   Grade 1       Pharmacist Intervention(increased ast/alt/t bili)   No       Any new drug interactions?   No    No   Is the dose as ordered appropriate for the patient?   Yes    Yes   Since the last time we talked, have you been hospitalized or used the emergency room?   No    No       Labs:  No results found for NA within last 30 days.     No results found for K within last 30  days.     No results found for CA within last 30 days.     No results found for Mag within last 30 days.     No results found for Phos within last 30 days.     No results found for ALBUMIN within last 30 days.     No results found for BUN within last 30 days.     No results found for CR within last 30 days.     No results found for AST within last 30 days.     No results found for ALT within last 30 days.     No results found for BILITOTAL within last 30 days.     No results found for WBC within last 30 days.     No results found for HGB within last 30 days.     No results found for PLT within last 30 days.     No results found for ANC within last 30 days.     No results found for ANC within last 30 days.        Assessment & Plan:  No concerning abnormalities. Continue abiraterone as planned.    Sent Wedding[employer] message to Davion.    Follow-Up:  11/14: monthly assessment.     Jessica Suazo PharmD  Hematology/Oncology Clinical Pharmacist  [facility]   [phone]

## 2023-11-06 ENCOUNTER — MYC MEDICAL ADVICE (OUTPATIENT)
Dept: ONCOLOGY | Facility: CLINIC | Age: 63
End: 2023-11-06
Payer: COMMERCIAL

## 2023-11-06 NOTE — TELEPHONE ENCOUNTER
Oral Chemotherapy Monitoring Program  Lab Follow Up    Reviewed CMP lab results from 11/3.        9/20/2023    11:00 AM 9/20/2023    11:47 AM 10/9/2023    11:00 AM 10/16/2023    10:00 AM 10/16/2023     4:00 PM 10/18/2023    12:00 PM 11/6/2023    11:00 AM   ORAL CHEMOTHERAPY   Assessment Type Left Voicemail Monthly Follow up Lab Monitoring Refill Left Voicemail Monthly Follow up Lab Monitoring   Diagnosis Code Prostate Cancer Prostate Cancer Prostate Cancer Prostate Cancer Prostate Cancer Prostate Cancer Prostate Cancer   Providers Dr. Sosa Swan   [facility]   Is this patient followed by the [facility] OC team? Yes Yes Yes Yes Yes Yes Yes   Drug Name Zytiga (abiraterone) Zytiga (abiraterone) Zytiga (abiraterone) Zytiga (abiraterone) Zytiga (abiraterone) Zytiga (abiraterone) Zytiga (abiraterone)   Dose 500 mg 500 mg 500 mg 500 mg 500 mg 500 mg 500 mg   Current Schedule Daily Daily Daily Daily Daily Daily Daily   Cycle Details Continuous Continuous Continuous Continuous Continuous Continuous Continuous   Doses missed in last 2 weeks  0    0    Adherence Assessment  Adherent    Adherent    Adverse Effects  Headache;Myalgias/Arthralgias;Increased AST/ALT/T BILI    No AE identified during assessment    Myalgias/Arthralgias  Grade 1        Pharmacist Intervention(myalgias/arthralgias)  No        Headache  Grade 1        Pharmacist Intervention(headache)  No        Increased AST/ALT/T BILI  Grade 1        Pharmacist Intervention(increased ast/alt/t bili)  No        Any new drug interactions?  No    No    Is the dose as ordered appropriate for the patient?  Yes    Yes    Since the last time we talked, have you been hospitalized or used the emergency room?  No    No        Labs:  No results found for NA within last 30 days.     No results found for K within last 30 days.      No results found for CA within last 30 days.     No results found for Mag within last 30 days.     No results found for Phos within last 30 days.     No results found for ALBUMIN within last 30 days.     No results found for BUN within last 30 days.     No results found for CR within last 30 days.     No results found for AST within last 30 days.     No results found for ALT within last 30 days.     No results found for BILITOTAL within last 30 days.     No results found for WBC within last 30 days.     No results found for HGB within last 30 days.     No results found for PLT within last 30 days.     No results found for ANC within last 30 days.     No results found for ANC within last 30 days.        Assessment & Plan:  Results for the CMP show no concerning abnormalities.    Questions answered to patient's satisfaction.  Follow-Up:  11/14: monthly assessment.     Jessica JamesD  Hematology/Oncology Clinical Pharmacist  [facility]   [phone]

## 2023-11-14 ENCOUNTER — TELEPHONE (OUTPATIENT)
Dept: ONCOLOGY | Facility: CLINIC | Age: 63
End: 2023-11-14
Payer: COMMERCIAL

## 2023-11-14 DIAGNOSIS — C61 PROSTATE CANCER (H): Primary | ICD-10-CM

## 2023-11-14 DIAGNOSIS — C77.9 REGIONAL LYMPH NODE METASTASIS PRESENT (H): ICD-10-CM

## 2023-11-14 RX ORDER — ABIRATERONE ACETATE 250 MG/1
500 TABLET ORAL DAILY
Qty: 60 TABLET | Refills: 0 | Status: SHIPPED | OUTPATIENT
Start: 2023-11-14 | End: 2023-12-14

## 2023-11-14 RX ORDER — PREDNISONE 5 MG/1
5 TABLET ORAL 2 TIMES DAILY
Qty: 60 TABLET | Refills: 0 | Status: SHIPPED | OUTPATIENT
Start: 2023-11-14 | End: 2023-12-18

## 2023-11-14 NOTE — TELEPHONE ENCOUNTER
Oral Chemotherapy Monitoring Program    Subjective/Objective:  Davion Yang is a 63 year old male contacted by phone for a follow-up visit for oral chemotherapy.  Spoke with pt's wife, Gi. Gi confirms pt is taking the appropriate dose of abiraterone, [id number] daily on an empty stomach and prednisone 5mg twice daily. Denies new or worsening side effects, missed doses, and recent hospital or ED visits. Pt was taking prednisone only once daily, but in the past month has increased to the prescribed dose of twice daily.  Pt skipped his most recent Lupron shot. Pt's wife states pt feels good on this dose of abiraterone.          10/9/2023    11:00 AM 10/16/2023    10:00 AM 10/16/2023     4:00 PM 10/18/2023    12:00 PM 11/6/2023    11:00 AM 11/14/2023    10:00 AM 11/14/2023     3:00 PM   ORAL CHEMOTHERAPY   Assessment Type Lab Monitoring Refill Left Voicemail Monthly Follow up Lab Monitoring Refill Monthly Follow up   Diagnosis Code Prostate Cancer Prostate Cancer Prostate Cancer Prostate Cancer Prostate Cancer Prostate Cancer Prostate Cancer   Providers Dr. Sosa Swan   [facility]   Is this patient followed by the [facility] OC team? Yes Yes Yes Yes Yes Yes Yes   Drug Name Zytiga (abiraterone) Zytiga (abiraterone) Zytiga (abiraterone) Zytiga (abiraterone) Zytiga (abiraterone) Zytiga (abiraterone) Zytiga (abiraterone)   Dose 500 mg 500 mg 500 mg 500 mg 500 mg 500 mg 500 mg   Current Schedule Daily Daily Daily Daily Daily Daily Daily   Cycle Details Continuous Continuous Continuous Continuous Continuous Continuous Continuous   Doses missed in last 2 weeks    0   0   Adherence Assessment    Adherent   Adherent   Adverse Effects    No AE identified during assessment   No AE identified during assessment   Any new drug interactions?    No   No   Is the dose as ordered  "appropriate for the patient?    Yes   Yes   Since the last time we talked, have you been hospitalized or used the emergency room?    No   No       Last PHQ-2 Score on record:        No data to display                Vitals:  BP:   BP Readings from Last 1 Encounters:   09/13/23 119/80     Wt Readings from Last 1 Encounters:   09/13/23 103.2 kg (227 lb 9.6 oz)     Estimated body surface area is 2.25 meters squared as calculated from the following:    Height as of 9/13/23: 1.763 m (5' 9.39\").    Weight as of 9/13/23: 103.2 kg (227 lb 9.6 oz).    Labs:  No results found for NA within last 30 days.     No results found for K within last 30 days.     No results found for CA within last 30 days.     No results found for Mag within last 30 days.     No results found for Phos within last 30 days.     No results found for ALBUMIN within last 30 days.     No results found for BUN within last 30 days.     No results found for CR within last 30 days.     No results found for AST within last 30 days.     No results found for ALT within last 30 days.     No results found for BILITOTAL within last 30 days.     No results found for WBC within last 30 days.     No results found for HGB within last 30 days.     No results found for PLT within last 30 days.     No results found for ANC within last 30 days.     No results found for ANC within last 30 days.          Assessment/Plan:  Pt is tolerating abiraterone at the 500mg daily dose. Continue therapy as planned.    Follow-Up:  12/1: review labs in CE    Refill Due:  [facility] to deliver abiraterone and prednisone 11/17    Grace Myhre, PharmD  Hematology/Oncology Pharmacist  [facility]  [facility]  [phone]    "

## 2023-12-04 ENCOUNTER — MYC MEDICAL ADVICE (OUTPATIENT)
Dept: ONCOLOGY | Facility: CLINIC | Age: 63
End: 2023-12-04
Payer: COMMERCIAL

## 2023-12-04 NOTE — TELEPHONE ENCOUNTER
Oral Chemotherapy Monitoring Program  Lab Follow Up    Reviewed CMP lab results from 12/1.        10/16/2023    10:00 AM 10/16/2023     4:00 PM 10/18/2023    12:00 PM 11/6/2023    11:00 AM 11/14/2023    10:00 AM 11/14/2023     3:00 PM 12/4/2023    11:00 AM   ORAL CHEMOTHERAPY   Assessment Type Refill Left Voicemail Monthly Follow up Lab Monitoring Refill Monthly Follow up Lab Monitoring   Diagnosis Code Prostate Cancer Prostate Cancer Prostate Cancer Prostate Cancer Prostate Cancer Prostate Cancer Prostate Cancer   Providers Dr. Sosa Swan   [facility]   Is this patient followed by the [facility] OC team? Yes Yes Yes Yes Yes Yes Yes   Drug Name Zytiga (abiraterone) Zytiga (abiraterone) Zytiga (abiraterone) Zytiga (abiraterone) Zytiga (abiraterone) Zytiga (abiraterone) Zytiga (abiraterone)   Dose 500 mg 500 mg 500 mg 500 mg 500 mg 500 mg 500 mg   Current Schedule Daily Daily Daily Daily Daily Daily Daily   Cycle Details Continuous Continuous Continuous Continuous Continuous Continuous Continuous   Doses missed in last 2 weeks   0   0    Adherence Assessment   Adherent   Adherent    Adverse Effects   No AE identified during assessment   No AE identified during assessment    Any new drug interactions?   No   No    Is the dose as ordered appropriate for the patient?   Yes   Yes    Since the last time we talked, have you been hospitalized or used the emergency room?   No   No      Labs:          Assessment & Plan:  No concerning abnormalities. Continue abiraterone/prednisone as planned.    Tide[employer] message sent to patient.    Follow-Up:  12/11: monthly assessment and refill  12/13: appointment with Dr Sosa Dukes, PharmD  PGY-2 Oncology Pharmacy Resident  [facility]  [phone]

## 2023-12-13 ENCOUNTER — VIRTUAL VISIT (OUTPATIENT)
Dept: ONCOLOGY | Facility: CLINIC | Age: 63
End: 2023-12-13
Attending: INTERNAL MEDICINE
Payer: COMMERCIAL

## 2023-12-13 VITALS — BODY MASS INDEX: 29.35 KG/M2 | WEIGHT: 205 LBS | HEIGHT: 70 IN

## 2023-12-13 DIAGNOSIS — C61 MALIGNANT NEOPLASM OF PROSTATE (H): Primary | ICD-10-CM

## 2023-12-13 PROCEDURE — 99215 OFFICE O/P EST HI 40 MIN: CPT | Mod: 95 | Performed by: INTERNAL MEDICINE

## 2023-12-13 ASSESSMENT — PAIN SCALES - GENERAL: PAINLEVEL: SEVERE PAIN (6)

## 2023-12-13 NOTE — NURSING NOTE
Is the patient currently in the state of [place]? YES    Visit mode:VIDEO    If the visit is dropped, the patient can be reconnected by: VIDEO VISIT: Send to e-mail at: [email or web address]    Will anyone else be joining the visit? NO  (If patient encounters technical issues they should call [phone]    How would you like to obtain your AVS? MyChart    Are changes needed to the allergy or medication list? No    Reason for visit: OLEKSANDR KAN

## 2023-12-13 NOTE — LETTER
12/13/2023         RE: Davion aYng  [address] [place] [address]        Dear Colleague,    Thank you for referring your patient, Davion Yang, to the [facility]. Please see a copy of my visit note below.      Virtual Visit Details    Type of service:  Video Visit   Originating Location (pt. Location):  Home    Distant Location (provider location):  [facility]  Platform used for Video Visit:  AmWell      -----------------------------------------------------------------------------------------------------------    FOLLOW UP VISIT  -  TELEMEDICINE     Reason for Visit:  Pelvic recurrence of prostate cancer post-RP, received salvage XRT plus ADT/abiraterone (500 mg).     History of Present Illness:   Mr. Yang is a 63 year old man with a history of prostate cancer as well as esophageal adenocarcinoma (8/2013).  5/1/2018: Prostate biopsy: adenocarcinoma Jules 4+4=8.  PSA was 12.9 ng/mL.  9/1/2022: Prostatectomy (pT3a N0 R0), pathologic evaluation demonstrated Chambers 4+4=8; four lymph nodes were normal.  Persistent PSA elevation post-operatively.  10/6/2022: PSMA-PET demonstrated increased uptake in the prostate bed and bilateral pelvic lymph nodes.   Treated with salvage XRT plus ADT/abiraterone.    Genomics ([facility]):  TMPRSS2-ERG fusion, CTNNB1 p.T41A, CDKN1B loss; MS - stable; TMB 3 muts/Mb; gLOH 6%; PD-L1 0%.     PSA levels  5/1/2018        PSA 12.92  5/02/2019      PSA 20.77  7/16/2020      PSA 34.63  1/27/2022      PSA 37.84  9/29/2022      PSA 13.21  1/2023           START Lupron + Zytiga  2/7/2023        PSA = 6.49  Started Salvage Radiation  3/13/23          PSA = 1.7  3/24/23          Radiation Completed.   07/31/23        PSA <0.10, T undetectable  09/08/23        PSA <0.01  10/26/23        PSA <0.01  Held Lupron in 12/2023. On Arianna 500 mg.     Review of Systems:   He reports ankle, shoulder, and pain all on the right side. Along with a  "right sided headache. He found that with in one week his arthralgias improved. He has a long standing history of R shoulder pain feels this is better. His headaches have also improved. His last headache was last week. Energy is \"okay but room for improvement\". He feels his energy is still low. He is active around the yard and boat. He also takes daily naps. He endorses hot flashes- fluctuates in intensity. Blood pressure has been \"pretty good\". No urinary changes. No peripheral edema. Completed radiation in march. Last lupron on 07/24/23, was a 3-month dose. He re-started Zytiga 750 mg three months ago, and is now on the 500 mg dose.  He also decided to stop Lupron in 12/2023.     Mental health has been \"a struggle\". Started Wellbutrin early July with improvement. No suicidal thoughts. Felt a huge change in mood after temporarily stopping abiraterone and wellbutrin. He has erectile dysfunction and would like a referral to urology as he has not found what his local urologist has suggested to be helpful.  He still continues to suffer from erectile dysfunction despite trying sildenafil.  A comprehensive 14-point review of systems was performed and was all negative except for symptoms noted above in HPI.     PMHx:  Prostate cancer with pelvic recurrence s/p RP  Esophageal carcinoma (2013)     MEDICATIONS         Current Outpatient Medications   Medication Instructions      Lupron 22.5 mg subcutaneous, last dose 7/24/23 Receives this at [facility]    Abiraterone (ZYTIGA) 500 mg, Oral, DAILY, Take on empty stomach.    Aloe LIQD Oral, DAILY    predniSONE (DELTASONE) 5 mg, Oral, once daily    sildenafil (REVATIO) 20 mg    vitamin D3 (CHOLECALCIFEROL) 2000 units One tablet, Oral, DAILY       PHYSICAL EXAMINATION  General: Patient appears well. Normal body habitus  HEENT: Normocephalic, atraumatic.   Cardiac: Regular rate and rhythm. No murmurs, rub, or extra heart sounds  Lungs: Clear to auscultation " bilaterally  Abdomen: Normal bowel sounds. No tenderness to palpation  Extremities: No pedal/ankle edema  Skin: no rash or dermatitis     PSMA-PET Scan 10/6/2022  1.  Abnormal radiotracer uptake within bilateral pelvic lymph nodes, compatible with metastatic disease.   2.  Abnormal radiotracer uptake at the site of prostatectomy and prior fluid collection is indeterminate. This is concerning for residual or recurrent disease, but the differential would include inflammatory findings related to the prior anastomotic leak and associated fluid collection.      PATHOLOGY 9/1/2022  Prostate, radical prostatectomy:  - Prostatic adenocarcinoma, acinar type, with extensive cribriform growth.  - Histologic grade: [place] score 4+4 = 8 (grade group 4). Margins negative.  - Extraprostatic extension identified (nonfocal); no seminal vesicle invasion identified.  - Lymph nodes (pelvic):  0/4 involved.  - AJCC stage:  pT3a pN0 pR0.        ASSESSMENT AND PLAN  #Prostate cancer with pelvic alena recurrence s/p RP, received salvage XRT plus ADT/abiraterone  - Recommended ADT plus abiraterone for 2 years. Patient had increased arthralgias, headaches, and worsened mood on 1000 mg abiraterone. He has been holding since 06/17/23.  Three month ago, he re-started abiraterone 750 mg, subsequently reduced to 500 mg.  - His PSA is undetectable at <0.10 with suppressed testosterone. We reviewed that this is the expected effect of ADT and abiraterone. Undetectable PSA is not a reason to stop therapies.   - Reviewed with local metastatic disease in the pelvis he is a candidate for both ADT plus abiraterone without bone metastasis per the STAMPEDE trial. Per the trial, men with locally advanced or metastatic prostate cancer who received ADT plus abiraterone had significantly higher rates of overall and failure-free survival than those who received ADT alone.  - He is now taking abiraterone 750 mg per day on an empty stomach given side effects at  full dose.  - Continue ADT/abiraterone for one more year, until December 2024.  - He has asked to further reduce his Abiraterone, and we have decided to recommend taking 500 mg daily.  - He also decided that he would like to stop his Lupron for QoL reasons, and thus he omitted his 12/2023 dose.     #Erectile dysfunction  - has tried Sildenafil and Trimix. Discussed I refer patients to PCP or urology to help with management. Would like to establish with urology at [place].      #Mood  - continue wellbutrin per PCP   - continue antidepressant therapy.      A total of 40 minutes were spent on the date of the encounter conducting chart review, review of test results, patient visit, and documentation.  The patient was given the opportunity to ask several questions today, all of which were answered to his satisfaction.     Ulices Swan M.D.

## 2023-12-15 DIAGNOSIS — C77.9 REGIONAL LYMPH NODE METASTASIS PRESENT (H): ICD-10-CM

## 2023-12-15 DIAGNOSIS — C61 PROSTATE CANCER (H): Primary | ICD-10-CM

## 2023-12-18 ENCOUNTER — TELEPHONE (OUTPATIENT)
Dept: ONCOLOGY | Facility: CLINIC | Age: 63
End: 2023-12-18
Payer: COMMERCIAL

## 2023-12-18 DIAGNOSIS — C77.9 REGIONAL LYMPH NODE METASTASIS PRESENT (H): ICD-10-CM

## 2023-12-18 DIAGNOSIS — C61 PROSTATE CANCER (H): Primary | ICD-10-CM

## 2023-12-18 RX ORDER — PREDNISONE 5 MG/1
5 TABLET ORAL 2 TIMES DAILY
Qty: 60 TABLET | Refills: 0 | Status: SHIPPED | OUTPATIENT
Start: 2023-12-18 | End: 2024-02-16

## 2023-12-18 RX ORDER — ABIRATERONE ACETATE 250 MG/1
500 TABLET ORAL DAILY
Qty: 60 TABLET | Refills: 0 | Status: SHIPPED | OUTPATIENT
Start: 2023-12-18 | End: 2024-01-17

## 2023-12-18 NOTE — TELEPHONE ENCOUNTER
Oral Chemotherapy Monitoring Program     Placed call to Davion Yang in follow up of Zytiga oral chemotherapy.     Left a message requesting a call back. No drug names were mentioned in the voicemail. Will update when response is received.    Deborah Oneill, JacobD  Oral Chemotherapy Monitoring Program  [facility]  [phone]  December 18, 2023

## 2023-12-20 ENCOUNTER — TELEPHONE (OUTPATIENT)
Dept: ONCOLOGY | Facility: CLINIC | Age: 63
End: 2023-12-20
Payer: COMMERCIAL

## 2023-12-20 NOTE — ORAL ONC MGMT
Oral Chemotherapy Monitoring Program     Placed call to patient in follow up of oral chemotherapy for refill and assessment. Left message requesting call back. No drug names were mentioned. Will update when response received.     Jagdeep Hartman, PharmD  Hematology/Oncology Clinical Pharmacist  [facility]  [facility]

## 2023-12-22 ENCOUNTER — TELEPHONE (OUTPATIENT)
Dept: ONCOLOGY | Facility: CLINIC | Age: 63
End: 2023-12-22
Payer: COMMERCIAL

## 2023-12-22 NOTE — TELEPHONE ENCOUNTER
PA Initiation    Medication: ABIRATERONE ACETATE 250 MG PO TABS  Insurance Company: [email or web address] - Phone [phone] Fax [phone]  Pharmacy Filling the Rx:    Filling Pharmacy Phone:    Filling Pharmacy Fax:    Start Date: 12/22/2023         Ghislaine Israel CPhTOncology Pharmacy [facility] [address] [address]  Office: [phone]  Fax: [phone]  [email or web address]

## 2023-12-23 NOTE — TELEPHONE ENCOUNTER
Prior Authorization Approval    Medication: ABIRATERONE ACETATE 250 MG PO TABS  Authorization Effective Date: 12/22/2023  Authorization Expiration Date: 12/29/2024  Approved Dose/Quantity:   Reference #: [id number]   Insurance Company: [employer] - Phone [phone] Fax [phone]  Expected CoPay: $    CoPay Card Available: No    Financial Assistance Needed:   Which Pharmacy is filling the prescription: [facility] - [place], [place] - [address] [place] [address]  Pharmacy Notified:   Patient Notified:         Ghislaine Israel CPhTOncology Pharmacy [facility]  [address] [place] [id number]  Office: [phone]  Fax: [phone]  [email or web address]

## 2024-01-15 ENCOUNTER — MYC MEDICAL ADVICE (OUTPATIENT)
Dept: ONCOLOGY | Facility: CLINIC | Age: 64
End: 2024-01-15

## 2024-01-15 NOTE — ORAL ONC MGMT
Oral Chemotherapy Monitoring Program  Lab Follow Up    Reviewed lab results from 1/12/24.        11/14/2023     3:00 PM 12/4/2023    11:00 AM 12/18/2023    10:00 AM 12/18/2023     1:00 PM 12/20/2023     3:00 PM 12/22/2023     9:00 AM 1/15/2024     1:00 PM   ORAL CHEMOTHERAPY   Assessment Type Monthly Follow up Lab Monitoring Refill Left Voicemail Left Voicemail Left Voicemail Lab Monitoring   Diagnosis Code Prostate Cancer Prostate Cancer Prostate Cancer Prostate Cancer Prostate Cancer Prostate Cancer Prostate Cancer   Providers Dr. Sosa Swan   [facility]   Is this patient followed by the [facility] OC team? Yes Yes Yes Yes Yes Yes Yes   Drug Name Zytiga (abiraterone) Zytiga (abiraterone) Zytiga (abiraterone) Zytiga (abiraterone) Zytiga (abiraterone) Zytiga (abiraterone) Zytiga (abiraterone)   Dose 500 mg 500 mg 500 mg 500 mg 500 mg 500 mg 500 mg   Current Schedule Daily Daily Daily Daily Daily Daily Daily   Cycle Details Continuous Continuous Continuous Continuous Continuous Continuous Continuous   Doses missed in last 2 weeks 0         Adherence Assessment Adherent         Adverse Effects No AE identified during assessment         Any new drug interactions? No         Is the dose as ordered appropriate for the patient? Yes         Since the last time we talked, have you been hospitalized or used the emergency room? No             Labs:  No results found for NA within last 30 days.     No results found for K within last 30 days.     No results found for CA within last 30 days.     No results found for Mag within last 30 days.     No results found for Phos within last 30 days.     No results found for ALBUMIN within last 30 days.     No results found for BUN within last 30 days.     No results found for CR within last 30 days.     No results found for AST  within last 30 days.     No results found for ALT within last 30 days.     No results found for BILITOTAL within last 30 days.     No results found for WBC within last 30 days.     No results found for HGB within last 30 days.     No results found for PLT within last 30 days.     No results found for ANC within last 30 days.     No results found for ANC within last 30 days.        Assessment & Plan:  No concerning abnormalities.  Will continue Zytiga therapy as planned.     [employer] message sent to patient.     Follow-Up:  1/22: Monthly assessment  2/12: Monthly labs    Deborah Oneill, PharmD  Hematology/Oncology Clinical Pharmacist  [facility]  [phone]

## 2024-01-22 DIAGNOSIS — C61 PROSTATE CANCER (H): Primary | ICD-10-CM

## 2024-01-22 DIAGNOSIS — C77.9 REGIONAL LYMPH NODE METASTASIS PRESENT (H): ICD-10-CM

## 2024-01-22 RX ORDER — ABIRATERONE ACETATE 250 MG/1
500 TABLET ORAL DAILY
Qty: 60 TABLET | Refills: 0 | Status: SHIPPED | OUTPATIENT
Start: 2024-01-22 | End: 2024-02-16

## 2024-01-22 RX ORDER — PREDNISONE 5 MG/1
5 TABLET ORAL 2 TIMES DAILY
Qty: 60 TABLET | Refills: 0 | Status: SHIPPED | OUTPATIENT
Start: 2024-01-22 | End: 2024-02-16

## 2024-01-25 ENCOUNTER — TELEPHONE (OUTPATIENT)
Dept: ONCOLOGY | Facility: CLINIC | Age: 64
End: 2024-01-25

## 2024-01-25 NOTE — ORAL ONC MGMT
Oral Chemotherapy Monitoring Program    Subjective/Objective:  Davion Yang is a 63 year old male contacted by phone for a follow-up visit for oral chemotherapy.  Spoke to Gi, confirms Alfonso is taking the appropriate dose of abiraterone 500mg daily on empty stomach. Denies new or worsening side effects, missed doses, and recent hospital or ED visits. Denies any recent medication changes. No questions or concerns. Aware of next appointments.         12/18/2023    10:00 AM 12/18/2023     1:00 PM 12/20/2023     3:00 PM 12/22/2023     9:00 AM 1/15/2024     1:00 PM 1/22/2024     9:00 AM 1/25/2024     9:00 AM   ORAL CHEMOTHERAPY   Assessment Type Refill Left Voicemail Left Voicemail Left Voicemail Lab Monitoring Refill Monthly Follow up   Diagnosis Code Prostate Cancer Prostate Cancer Prostate Cancer Prostate Cancer Prostate Cancer Prostate Cancer Prostate Cancer   Providers Dr. Sosa Swan   [facility]   Is this patient followed by the [facility] OC team? Yes Yes Yes Yes Yes Yes Yes   Drug Name Zytiga (abiraterone) Zytiga (abiraterone) Zytiga (abiraterone) Zytiga (abiraterone) Zytiga (abiraterone) Zytiga (abiraterone) Zytiga (abiraterone)   Dose 500 mg 500 mg 500 mg 500 mg 500 mg 500 mg 500 mg   Current Schedule Daily Daily Daily Daily Daily Daily Daily   Cycle Details Continuous Continuous Continuous Continuous Continuous Continuous Continuous   Doses missed in last 2 weeks       0   Adherence Assessment       Adherent   Adverse Effects       No AE identified during assessment   Any new drug interactions?       No   Is the dose as ordered appropriate for the patient?       Yes   Since the last time we talked, have you been hospitalized or used the emergency room?       No       Last PHQ-2 Score on record:        No data to display                Vitals:  BP:   BP  "Readings from Last 1 Encounters:   09/13/23 119/80     Wt Readings from Last 1 Encounters:   12/13/23 93 kg (205 lb)     Estimated body surface area is 2.14 meters squared as calculated from the following:    Height as of 12/13/23: 1.778 m (5' 10\").    Weight as of 12/13/23: 93 kg (205 lb).    Assessment/Plan:  Denies new or worse side effects, continue plan of care.     Follow-Up:  Monthly labs   3/27: Dr. Swan visit with labs prior     Refill Due:  [facility] will deliver abiraterone and prednisone Friday 1/26    Jagdeep Hartman, PharmD  Hematology/Oncology Clinical Pharmacist  [facility]  [facility]  [phone]      "

## 2024-02-16 DIAGNOSIS — C77.9 REGIONAL LYMPH NODE METASTASIS PRESENT (H): ICD-10-CM

## 2024-02-16 DIAGNOSIS — C61 PROSTATE CANCER (H): Primary | ICD-10-CM

## 2024-02-16 RX ORDER — PREDNISONE 5 MG/1
5 TABLET ORAL 2 TIMES DAILY
Qty: 60 TABLET | Refills: 0 | Status: SHIPPED | OUTPATIENT
Start: 2024-02-16

## 2024-02-16 RX ORDER — ABIRATERONE ACETATE 250 MG/1
500 TABLET ORAL DAILY
Qty: 60 TABLET | Refills: 0 | Status: SHIPPED | OUTPATIENT
Start: 2024-02-16 | End: 2024-03-17

## 2024-03-10 ENCOUNTER — HEALTH MAINTENANCE LETTER (OUTPATIENT)
Age: 64
End: 2024-03-10

## 2024-03-20 DIAGNOSIS — C61 PROSTATE CANCER (H): Primary | ICD-10-CM

## 2024-03-20 DIAGNOSIS — C77.9 REGIONAL LYMPH NODE METASTASIS PRESENT (H): ICD-10-CM

## 2024-03-22 DIAGNOSIS — C77.9 REGIONAL LYMPH NODE METASTASIS PRESENT (H): ICD-10-CM

## 2024-03-22 DIAGNOSIS — C61 PROSTATE CANCER (H): Primary | ICD-10-CM

## 2024-03-22 RX ORDER — PREDNISONE 5 MG/1
5 TABLET ORAL 2 TIMES DAILY
Qty: 60 TABLET | Refills: 2 | Status: SHIPPED | OUTPATIENT
Start: 2024-03-23

## 2024-03-22 RX ORDER — ABIRATERONE ACETATE 250 MG/1
500 TABLET ORAL DAILY
Qty: 60 TABLET | Refills: 2 | Status: SHIPPED | OUTPATIENT
Start: 2024-03-23

## 2024-03-25 NOTE — PROGRESS NOTES
Virtual Visit Details    Type of service:  Video Visit   Originating Location (pt. Location):  Home    Distant Location (provider location):  [facility]  Platform used for Video Visit:  Joey    ----------------------------------------------------------------------------------------------------------------------------------------------------------------------    FOLLOW UP VISIT  -  TELEMEDICINE     Reason for Visit:  Pelvic recurrence of prostate cancer post-RP, received salvage XRT plus ADT/abiraterone (500 mg).     History of Present Illness:   Mr. Yang is a 63 year old man with a history of prostate cancer as well as esophageal adenocarcinoma (8/2013).  5/1/2018: Prostate biopsy: adenocarcinoma Williams 4+4=8.  PSA was 12.9 ng/mL.  9/1/2022: Prostatectomy (pT3a N0 R0), pathologic evaluation demonstrated Jules 4+4=8; four lymph nodes were normal.  Persistent PSA elevation post-operatively.  10/6/2022: PSMA-PET demonstrated increased uptake in the prostate bed and bilateral pelvic lymph nodes.   Treated with salvage XRT plus ADT/abiraterone.    Genomics ([facility]): TMPRSS2-ERG fusion, CTNNB1 (p.T41A), CDKN1B deletion; MS - stable; TMB 3 muts/Mb; gLOH 6%.     PSA levels  5/1/2018        PSA 12.92  5/02/2019      PSA 20.77  7/16/2020      PSA 34.63  1/27/2022      PSA 37.84  9/29/2022      PSA 13.21  1/2023           START Lupron + Zytiga  2/7/2023        PSA = 6.49  Started Salvage Radiation  3/13/23          PSA = 1.7  3/24/23          Radiation Completed.   07/31/23        PSA <0.10, T undetectable  09/08/23        PSA <0.01  10/26/23        PSA <0.01  Held Lupron in 12/2023. On Arianna 500 mg.  03/20/24        PSA <0.01     Review of Systems:   He reports ankle, shoulder, and pain all on the right side. Along with a right sided headache. He found that with in one week his arthralgias improved. He has a long standing history of R shoulder pain feels this is better. His headaches have also  "improved. His last headache was last week. Energy is \"okay but room for improvement\". He feels his energy is still low. He is active around the yard and boat. He also takes daily naps. He endorses hot flashes- fluctuates in intensity. Blood pressure has been \"pretty good\". No urinary changes. No peripheral edema. Completed radiation in march. Last lupron on 07/24/23, was a 3-month dose. He re-started Zytiga 750 mg three months ago, and is now on the 500 mg dose.  He also decided to stop Lupron in 12/2023.     Mental health has been \"a struggle\". Started Wellbutrin early July with improvement. No suicidal thoughts. Felt a huge change in mood after temporarily stopping abiraterone and wellbutrin. He has erectile dysfunction and would like a referral to urology as he has not found what his local urologist has suggested to be helpful.  He still continues to suffer from erectile dysfunction despite trying sildenafil.  A comprehensive 14-point review of systems was performed and was all negative except for symptoms noted above in HPI.     PMHx:  Prostate cancer with pelvic recurrence s/p RP  Esophageal carcinoma (2013)     MEDICATIONS         Current Outpatient Medications   Medication Instructions      Lupron 22.5 mg subcutaneous, last dose 7/24/23 Receives this at [facility]    Abiraterone (ZYTIGA) 500 mg, Oral, DAILY    Aloe LIQD Oral, DAILY    predniSONE (DELTASONE) 5 mg, Oral, once daily    sildenafil (REVATIO) 20 mg    vitamin D3 (CHOLECALCIFEROL) 2000 units One tablet, Oral, DAILY       PHYSICAL EXAMINATION  General: Patient appears well. Normal body habitus  HEENT: Normocephalic, atraumatic.   Cardiac: Regular rate and rhythm. No murmurs, rub, or extra heart sounds  Lungs: Clear to auscultation bilaterally  Abdomen: Normal bowel sounds. No tenderness to palpation  Extremities: No pedal/ankle edema  Skin: no rash or dermatitis     PSMA-PET Scan 10/6/2022  1.  Abnormal radiotracer uptake within " bilateral pelvic lymph nodes, compatible with metastatic disease.   2.  Abnormal radiotracer uptake at the site of prostatectomy and prior fluid collection is indeterminate. This is concerning for residual or recurrent disease, but the differential would include inflammatory findings related to the prior anastomotic leak and associated fluid collection.      PATHOLOGY 9/1/2022  Prostate, radical prostatectomy:  - Prostatic adenocarcinoma, acinar type, with extensive cribriform growth.  - Histologic grade: [place] score 4+4=8 (grade group 4). Margins negative.  - Extraprostatic extension identified (nonfocal); no seminal vesicle invasion identified.  - Lymph nodes (pelvic):  0/4 involved.  - AJCC stage:  pT3a pN0 pR0.        ASSESSMENT AND PLAN  #Prostate cancer with pelvic alena recurrence s/p RP, received salvage XRT plus ADT/abiraterone  - Recommended ADT plus abiraterone for 2 years. Patient had increased arthralgias, headaches, and worsened mood on 1000 mg abiraterone. He has been holding since 06/17/23.  Six month ago, he re-started abiraterone 750 mg, subsequently reduced to 500 mg.  - His PSA is undetectable at <0.10 with suppressed testosterone. We reviewed that this is the expected effect of ADT and abiraterone. Undetectable PSA is not a reason to stop therapies.   - Reviewed with local metastatic disease in the pelvis he is a candidate for both ADT plus abiraterone without bone metastasis per the STAMPEDE trial. Per the trial, men with locally advanced or metastatic prostate cancer who received ADT plus abiraterone had significantly higher rates of overall and failure-free survival than those who received ADT alone.  - He is now taking abiraterone 500 mg per day on an empty stomach given side effects at full dose.  - Continue ADT/abiraterone for nine more months, until December 2024  - He also decided that he would like to stop his Lupron for QoL reasons, and thus he omitted his 12/2023 dose.     #Erectile  dysfunction  - Has tried Sildenafil and Trimix. Discussed I refer patients to PCP or urology to help with management.   - Would like to establish with urology at [place].      #Mood  - Continue wellbutrin per PCP.  - Continue antidepressant therapy.      A total of 40 minutes were spent on the date of the encounter conducting chart review, review of test results, patient visit, and documentation.  The patient was given the opportunity to ask several questions today, all of which were answered to his satisfaction.     Ulices Swan M.D.

## 2024-03-26 ENCOUNTER — TELEPHONE (OUTPATIENT)
Dept: ONCOLOGY | Facility: CLINIC | Age: 64
End: 2024-03-26
Payer: MEDICAID

## 2024-03-26 NOTE — TELEPHONE ENCOUNTER
Prior Authorization Approval    Medication: ABIRATERONE ACETATE 250 MG PO TABS  Authorization Effective Date: 12/22/2023  Authorization Expiration Date: 12/29/2024  Approved Dose/Quantity: 60/30  Reference #: [id number]   Insurance Company: [employer] - Phone [phone] Fax [phone]  Expected CoPay: $ 0  CoPay Card Available: No    Financial Assistance Needed: No  Which Pharmacy is filling the prescription: [facility] - [place], [place] - [address]  Pharmacy Notified: Yes  Patient Notified: No

## 2024-03-27 ENCOUNTER — VIRTUAL VISIT (OUTPATIENT)
Dept: ONCOLOGY | Facility: CLINIC | Age: 64
End: 2024-03-27
Attending: INTERNAL MEDICINE
Payer: MEDICAID

## 2024-03-27 ENCOUNTER — PATIENT OUTREACH (OUTPATIENT)
Dept: ONCOLOGY | Facility: CLINIC | Age: 64
End: 2024-03-27

## 2024-03-27 ENCOUNTER — DOCUMENTATION ONLY (OUTPATIENT)
Dept: ONCOLOGY | Facility: CLINIC | Age: 64
End: 2024-03-27

## 2024-03-27 DIAGNOSIS — C61 MALIGNANT NEOPLASM OF PROSTATE (H): Primary | ICD-10-CM

## 2024-03-27 PROCEDURE — 99215 OFFICE O/P EST HI 40 MIN: CPT | Mod: 95 | Performed by: INTERNAL MEDICINE

## 2024-03-27 NOTE — LETTER
3/27/2024         RE: Davion Yang  [address] [place] [address]        Dear Colleague,    Thank you for referring your patient, Davion Yang, to the [facility]. Please see a copy of my visit note below.      Virtual Visit Details    Type of service:  Video Visit   Originating Location (pt. Location):  Home    Distant Location (provider location):  [facility]  Platform used for Video Visit:  AmWell    ----------------------------------------------------------------------------------------------------------------------------------------------------------------------    FOLLOW UP VISIT  -  TELEMEDICINE     Reason for Visit:  Pelvic recurrence of prostate cancer post-RP, received salvage XRT plus ADT/abiraterone (500 mg).     History of Present Illness:   Mr. Yang is a 63 year old man with a history of prostate cancer as well as esophageal adenocarcinoma (8/2013).  5/1/2018: Prostate biopsy: adenocarcinoma Jules 4+4=8.  PSA was 12.9 ng/mL.  9/1/2022: Prostatectomy (pT3a N0 R0), pathologic evaluation demonstrated Jules 4+4=8; four lymph nodes were normal.  Persistent PSA elevation post-operatively.  10/6/2022: PSMA-PET demonstrated increased uptake in the prostate bed and bilateral pelvic lymph nodes.   Treated with salvage XRT plus ADT/abiraterone.    Genomics ([facility]): TMPRSS2-ERG fusion, CTNNB1 (p.T41A), CDKN1B deletion; MS - stable; TMB 3 muts/Mb; gLOH 6%.     PSA levels  5/1/2018        PSA 12.92  5/02/2019      PSA 20.77  7/16/2020      PSA 34.63  1/27/2022      PSA 37.84  9/29/2022      PSA 13.21  1/2023           START Lupron + Zytiga  2/7/2023        PSA = 6.49  Started Salvage Radiation  3/13/23          PSA = 1.7  3/24/23          Radiation Completed.   07/31/23        PSA <0.10, T undetectable  09/08/23        PSA <0.01  10/26/23        PSA <0.01  Held Lupron in 12/2023. On Arianna 500 mg.  03/20/24        PSA <0.01     Review of  "Systems:   He reports ankle, shoulder, and pain all on the right side. Along with a right sided headache. He found that with in one week his arthralgias improved. He has a long standing history of R shoulder pain feels this is better. His headaches have also improved. His last headache was last week. Energy is \"okay but room for improvement\". He feels his energy is still low. He is active around the yard and boat. He also takes daily naps. He endorses hot flashes- fluctuates in intensity. Blood pressure has been \"pretty good\". No urinary changes. No peripheral edema. Completed radiation in march. Last lupron on 07/24/23, was a 3-month dose. He re-started Zytiga 750 mg three months ago, and is now on the 500 mg dose.  He also decided to stop Lupron in 12/2023.     Mental health has been \"a struggle\". Started Wellbutrin early July with improvement. No suicidal thoughts. Felt a huge change in mood after temporarily stopping abiraterone and wellbutrin. He has erectile dysfunction and would like a referral to urology as he has not found what his local urologist has suggested to be helpful.  He still continues to suffer from erectile dysfunction despite trying sildenafil.  A comprehensive 14-point review of systems was performed and was all negative except for symptoms noted above in HPI.     PMHx:  Prostate cancer with pelvic recurrence s/p RP  Esophageal carcinoma (2013)     MEDICATIONS         Current Outpatient Medications   Medication Instructions      Lupron 22.5 mg subcutaneous, last dose 7/24/23 Receives this at [facility]    Abiraterone (ZYTIGA) 500 mg, Oral, DAILY    Aloe LIQD Oral, DAILY    predniSONE (DELTASONE) 5 mg, Oral, once daily    sildenafil (REVATIO) 20 mg    vitamin D3 (CHOLECALCIFEROL) 2000 units One tablet, Oral, DAILY       PHYSICAL EXAMINATION  General: Patient appears well. Normal body habitus  HEENT: Normocephalic, atraumatic.   Cardiac: Regular rate and rhythm. No murmurs, rub, or " extra heart sounds  Lungs: Clear to auscultation bilaterally  Abdomen: Normal bowel sounds. No tenderness to palpation  Extremities: No pedal/ankle edema  Skin: no rash or dermatitis     PSMA-PET Scan 10/6/2022  1.  Abnormal radiotracer uptake within bilateral pelvic lymph nodes, compatible with metastatic disease.   2.  Abnormal radiotracer uptake at the site of prostatectomy and prior fluid collection is indeterminate. This is concerning for residual or recurrent disease, but the differential would include inflammatory findings related to the prior anastomotic leak and associated fluid collection.      PATHOLOGY 9/1/2022  Prostate, radical prostatectomy:  - Prostatic adenocarcinoma, acinar type, with extensive cribriform growth.  - Histologic grade: [place] score 4+4=8 (grade group 4). Margins negative.  - Extraprostatic extension identified (nonfocal); no seminal vesicle invasion identified.  - Lymph nodes (pelvic):  0/4 involved.  - AJCC stage:  pT3a pN0 pR0.        ASSESSMENT AND PLAN  #Prostate cancer with pelvic alena recurrence s/p RP, received salvage XRT plus ADT/abiraterone  - Recommended ADT plus abiraterone for 2 years. Patient had increased arthralgias, headaches, and worsened mood on 1000 mg abiraterone. He has been holding since 06/17/23.  Six month ago, he re-started abiraterone 750 mg, subsequently reduced to 500 mg.  - His PSA is undetectable at <0.10 with suppressed testosterone. We reviewed that this is the expected effect of ADT and abiraterone. Undetectable PSA is not a reason to stop therapies.   - Reviewed with local metastatic disease in the pelvis he is a candidate for both ADT plus abiraterone without bone metastasis per the STAMPEDE trial. Per the trial, men with locally advanced or metastatic prostate cancer who received ADT plus abiraterone had significantly higher rates of overall and failure-free survival than those who received ADT alone.  - He is now taking abiraterone 500 mg per  day on an empty stomach given side effects at full dose.  - Continue ADT/abiraterone for nine more months, until December 2024  - He also decided that he would like to stop his Lupron for QoL reasons, and thus he omitted his 12/2023 dose.     #Erectile dysfunction  - Has tried Sildenafil and Trimix. Discussed I refer patients to PCP or urology to help with management.   - Would like to establish with urology at [place].      #Mood  - Continue wellbutrin per PCP.  - Continue antidepressant therapy.      A total of 40 minutes were spent on the date of the encounter conducting chart review, review of test results, patient visit, and documentation.  The patient was given the opportunity to ask several questions today, all of which were answered to his satisfaction.     Ulices Swan M.D.

## 2024-03-27 NOTE — PROGRESS NOTES
[facility]: Cancer Care Short Note                                                                                        Order for PSA and Testosterone faxed to [facility] for June. Fax confirmation received.      Radha Elizabeth, RN, BSN Care Coordinator  [facility]

## 2024-03-27 NOTE — NURSING NOTE
Is the patient currently in the state of [place]? YES    Visit mode:VIDEO    If the visit is dropped, the patient can be reconnected by: VIDEO VISIT: Send to e-mail at: [email or web address]    Will anyone else be joining the visit? NO  (If patient encounters technical issues they should call [phone]    How would you like to obtain your AVS? MyChart    Are changes needed to the allergy or medication list? No    Reason for visit: OLEKSANDR KAN

## 2024-04-19 ENCOUNTER — TELEPHONE (OUTPATIENT)
Dept: ONCOLOGY | Facility: CLINIC | Age: 64
End: 2024-04-19
Payer: MEDICAID

## 2024-04-19 NOTE — ORAL ONC MGMT
I have reviewed this patient and agree with the assessment and plan as detailed below.    Jagdeep Hartman, PharmD  Hematology/Oncology Clinical Pharmacist  [facility]  [facility]  [phone]    Oral Chemotherapy Monitoring Program    Subjective/Objective:  Davion Yang is a 63 year old male contacted by phone for a follow-up visit for oral chemotherapy. Alfonso's spouse, Gi, confirms that Alfonso is taking the appropriate dose of abiraterone 2x250 mg once daily. Gi denies that Alfonso is having new or worsening side effects, missed doses, and recent hospital or ED visits. Patient's chart indicates he was recently prescribed tadalafil, no DDI concerns.        1/15/2024     1:00 PM 1/22/2024     9:00 AM 1/25/2024     9:00 AM 2/16/2024    10:00 AM 3/22/2024    10:00 AM 3/27/2024     4:00 PM 4/19/2024    12:00 PM   ORAL CHEMOTHERAPY   Assessment Type Lab Monitoring Refill Monthly Follow up Refill Refill;Lab Monitoring Chart Review Quarterly Follow up   Diagnosis Code Prostate Cancer Prostate Cancer Prostate Cancer Prostate Cancer Prostate Cancer Prostate Cancer Prostate Cancer   Providers Dr. Sosa Swan   [facility]   Is this patient followed by the [facility] OC team? Yes Yes Yes Yes Yes Yes Yes   Drug Name Zytiga (abiraterone) Zytiga (abiraterone) Zytiga (abiraterone) Zytiga (abiraterone) Zytiga (abiraterone) Zytiga (abiraterone) Zytiga (abiraterone)   Dose 500 mg 500 mg 500 mg 500 mg 500 mg 500 mg 500 mg   Current Schedule Daily Daily Daily Daily Daily Daily Daily   Cycle Details Continuous Continuous Continuous Continuous Continuous Continuous Continuous   Doses missed in last 2 weeks   0    0   Adherence Assessment   Adherent    Non-adherent   Reason for Non-adherence       Drug on hold   Adherence Intervention Recommended        "None   Adverse Effects   No AE identified during assessment    No AE identified during assessment   Any new drug interactions?   No    No   Is the dose as ordered appropriate for the patient?   Yes    Yes   Since the last time we talked, have you been hospitalized or used the emergency room?   No    No       Last PHQ-2 Score on record:        No data to display                Vitals:  BP:   BP Readings from Last 1 Encounters:   09/13/23 119/80     Wt Readings from Last 1 Encounters:   12/13/23 93 kg (205 lb)     Estimated body surface area is 2.14 meters squared as calculated from the following:    Height as of 12/13/23: 1.778 m (5' 10\").    Weight as of 12/13/23: 93 kg (205 lb).    Labs:  No results found for NA within last 30 days.     No results found for K within last 30 days.     No results found for CA within last 30 days.     No results found for Mag within last 30 days.     No results found for Phos within last 30 days.     No results found for ALBUMIN within last 30 days.     No results found for BUN within last 30 days.     No results found for CR within last 30 days.     No results found for AST within last 30 days.     No results found for ALT within last 30 days.     No results found for BILITOTAL within last 30 days.     No results found for WBC within last 30 days.     No results found for HGB within last 30 days.     No results found for PLT within last 30 days.     No results found for ANC within last 30 days.     No results found for ANC within last 30 days.     Assessment/Plan:  Patient continues to tolerate abiraterone. Continue therapy as planned.    PDC = 0.87, lower due to historical drug hold. No current adherence concerns.    Follow-Up:  6/26 visit with Dr. Swan, patient needs to get labs prior to or during this visit.  7/15 quarterly assessment    Refill Due:  [facility] to deliver abiraterone + prednisone Tues 4/23    Digna Henson, Pharmacy Student  [facility]  [phone]

## 2024-06-03 DIAGNOSIS — C61 PROSTATE CANCER (H): Primary | ICD-10-CM

## 2024-06-03 DIAGNOSIS — C77.9 REGIONAL LYMPH NODE METASTASIS PRESENT (H): ICD-10-CM

## 2024-06-05 DIAGNOSIS — C61 PROSTATE CANCER (H): Primary | ICD-10-CM

## 2024-06-05 DIAGNOSIS — C77.9 REGIONAL LYMPH NODE METASTASIS PRESENT (H): ICD-10-CM

## 2024-06-05 RX ORDER — ABIRATERONE ACETATE 250 MG/1
500 TABLET ORAL DAILY
Qty: 60 TABLET | Refills: 2 | Status: SHIPPED | OUTPATIENT
Start: 2024-06-21

## 2024-06-05 RX ORDER — PREDNISONE 5 MG/1
5 TABLET ORAL 2 TIMES DAILY
Qty: 60 TABLET | Refills: 2 | Status: SHIPPED | OUTPATIENT
Start: 2024-06-21

## 2024-07-10 ENCOUNTER — VIRTUAL VISIT (OUTPATIENT)
Dept: ONCOLOGY | Facility: CLINIC | Age: 64
End: 2024-07-10
Attending: INTERNAL MEDICINE
Payer: MEDICAID

## 2024-07-10 DIAGNOSIS — C77.9 REGIONAL LYMPH NODE METASTASIS PRESENT (H): ICD-10-CM

## 2024-07-10 DIAGNOSIS — C61 PROSTATE CANCER (H): Primary | ICD-10-CM

## 2024-07-10 PROCEDURE — 99442 PR PHYSICIAN TELEPHONE EVALUATION 11-20 MIN: CPT | Mod: 93

## 2024-07-10 NOTE — LETTER
7/10/2024      Davion Yang  [address]  [place] [address]      Dear Colleague,    Thank you for referring your patient, Davion Yang, to the [facility]. Please see a copy of my visit note below.    Virtual Visit Details    Type of service:  Video Visit --> changed to telephone as patient couldn't get video to start on Amwell.    Video Start Time:  1:38  Video End Time:1:54 PM    Originating Location (pt. Location): Home    Distant Location (provider location):  Off-site  Platform used for Video Visit: AmWell    ----------------------------------------------------------------------------------------------------------------------------------------------------------------------    FOLLOW UP VISIT  -  TELEMEDICINE     Reason for Visit:  Pelvic recurrence of prostate cancer post-RP, received salvage XRT plus ADT/abiraterone (500 mg).     History of Present Illness:   Mr. Yang is a 63-year-old man with a history of prostate cancer as well as esophageal adenocarcinoma (8/2013).  5/1/2018: Prostate biopsy: adenocarcinoma [place] 4+4=8.  PSA was 12.9 ng/mL.  9/1/2022: Prostatectomy (pT3a N0 R0), pathologic evaluation demonstrated Jules 4+4=8; four lymph nodes were normal.  Persistent PSA elevation post-operatively.  10/6/2022: PSMA-PET demonstrated increased uptake in the prostate bed and bilateral pelvic lymph nodes.   Treated with salvage XRT plus ADT/abiraterone.  Currently on abiraterone 500 mg. Stopped ADT in 12/2023.    Genomics ([facility]): TMPRSS2-ERG fusion, CTNNB1 (p.T41A), CDKN1B deletion; MS - stable; TMB 3 muts/Mb; gLOH 6%.     PSA levels  5/1/2018        PSA 12.92  5/02/2019      PSA 20.77  7/16/2020      PSA 34.63  1/27/2022      PSA 37.84  9/29/2022      PSA 13.21  1/2023           START Lupron + Zytiga  2/7/2023        PSA = 6.49  Started Salvage Radiation  3/13/23          PSA = 1.7  3/24/23          Radiation Completed.   07/31/23        PSA <0.10, T  undetectable  09/08/23        PSA <0.01  10/26/23        PSA <0.01  Held Lupron in 12/2023. On Arianna 500 mg.  03/20/24        PSA <0.01     Review of Systems:   Alfonso is seen with Gi. He is continuing on Zytiga 500 mg daily, he has swelling in the bilateral lower extremities. He thinks possibly the R>L. This is ongoing since starting Zytiga seems to be gradually getting worse. No rashes or skin changes. 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 He is doing compression socks. He has cut back on his salt and drinking a lot of water. When he elevates his feet at bedtime which seems to be helpful. No significant energy changes. He has continued hot flashes they fluctuate in intensity. Bowels have been normal. They are not constipated or diarrhea. No urinary changes. No new pains in his body. He is taking prednisone 5 mg BID. No recent fevers or chills. No chest pain, shortness of breath, or cough.     Mood is stable.      A comprehensive 14-point review of systems was performed and was all negative except for symptoms noted above in HPI.      MEDICATIONS         Current Outpatient Medications   Medication Instructions       Lupron 22.5 mg subcutaneous, last dose 7/24/23 [facility].  Stopped in 12/2023     Abiraterone (ZYTIGA) 500 mg, Oral, DAILY     Aloe LIQD Oral, DAILY     predniSONE (DELTASONE) 5 mg, Oral, once daily     sildenafil (REVATIO) 20 mg     vitamin D3 (CHOLECALCIFEROL) 2000 units One tablet, Oral, DAILY       PHYSICAL EXAMINATION  Telephone Physical Exam   General: Alert and no distress //Respiratory: No audible wheeze, cough, or shortness of breath // Psychiatric:  Appropriate affect, tone, and pace of words       PATHOLOGY 9/1/2022  Prostate, radical prostatectomy:  - Prostatic adenocarcinoma, acinar type, with extensive cribriform growth.  - Histologic grade: Jules score 4+4=8 (grade group 4). Margins negative.  - Extraprostatic extension identified  (nonfocal); no seminal vesicle invasion identified.  - Lymph nodes (pelvic):  0/4 involved.  - AJCC stage:  pT3a pN0 pR0.    PSMA-PET Scan 10/6/2022  Abnormal radiotracer uptake within bilateral pelvic lymph nodes, compatible with metastatic disease.  Abnormal radiotracer uptake at the site of prostatectomy and prior fluid collection is indeterminate. This is concerning for residual or recurrent disease, but the differential would include inflammatory findings related to the prior anastomotic leak and associated fluid collection.      ASSESSMENT AND PLAN  #Prostate cancer with pelvic alena recurrence s/p RP, received salvage XRT plus ADT/abiraterone  - Recommended ADT plus abiraterone for 2 years. Patient had increased arthralgias, headaches, and worsened mood on 1000 mg abiraterone.  Nine month ago, he reduced abiraterone to 750 mg, and subsequently reduced again to 500 mg. He is tolerating 500 mg well with the exception of peripheral edema.   - His PSA is undetectable at <0.10 with suppressed testosterone on recent checks. We reviewed that this is the expected effect of ADT and abiraterone. Undetectable PSA is not a reason to stop therapies. He unfortunately did not have recent labs prior to our visit today 7/10/24 but asked patient to set these up within the next week or so locally.   - Reviewed with local metastatic disease in the pelvis he is a candidate for both ADT plus abiraterone without bone metastasis per the STAMPEDE trial. Per the trial, men with locally advanced or metastatic prostate cancer who received ADT plus abiraterone had significantly higher rates of overall and failure-free survival than those who received ADT alone.  - He is now taking abiraterone 500 mg per day on an empty stomach given side effects at full dose.  - Continue abiraterone for six more months, until December 2024. He will see Dr. Swan in three months.   - He also decided that he would like to stop his Lupron for QoL  reasons, and thus he omitted his 12/2023 dose.    #Peripheral edema  - unable to assess due to video function not working. This may be due to Zytiga but would be strange given it is only now bothersome after several months of therapy. I do feel Alfonso needs an in person assessment of the peripheral edema to adequately assess next steps and diagnostic imaging pending exam. He lives in [place], therefore he will proceed with scheduling a PCP follow up for further work up.      #Mood  - Continue wellbutrin per PCP.  - Continue antidepressant therapy.   - stable 7.10.24.     25 minutes spent on the date of the encounter doing chart review, review of test results, interpretation of tests, patient visit, and documentation     Alice Hawthorne PA-C             Again, thank you for allowing me to participate in the care of your patient.        Sincerely,        Alice Hawthorne PA-C

## 2024-07-10 NOTE — PROGRESS NOTES
Virtual Visit Details    Type of service:  Video Visit --> changed to telephone as patient couldn't get video to start on Amwell.    Video Start Time:  1:38  Video End Time:1:54 PM    Originating Location (pt. Location): Home    Distant Location (provider location):  Off-site  Platform used for Video Visit: AmWell    ----------------------------------------------------------------------------------------------------------------------------------------------------------------------    FOLLOW UP VISIT  -  TELEMEDICINE     Reason for Visit:  Pelvic recurrence of prostate cancer post-RP, received salvage XRT plus ADT/abiraterone (500 mg).     History of Present Illness:   Mr. Yang is a 63-year-old man with a history of prostate cancer as well as esophageal adenocarcinoma (8/2013).  5/1/2018: Prostate biopsy: adenocarcinoma [place] 4+4=8.  PSA was 12.9 ng/mL.  9/1/2022: Prostatectomy (pT3a N0 R0), pathologic evaluation demonstrated Jules 4+4=8; four lymph nodes were normal.  Persistent PSA elevation post-operatively.  10/6/2022: PSMA-PET demonstrated increased uptake in the prostate bed and bilateral pelvic lymph nodes.   Treated with salvage XRT plus ADT/abiraterone.  Currently on abiraterone 500 mg. Stopped ADT in 12/2023.    Genomics ([facility]): TMPRSS2-ERG fusion, CTNNB1 (p.T41A), CDKN1B deletion; MS - stable; TMB 3 muts/Mb; gLOH 6%.     PSA levels  5/1/2018        PSA 12.92  5/02/2019      PSA 20.77  7/16/2020      PSA 34.63  1/27/2022      PSA 37.84  9/29/2022      PSA 13.21  1/2023           START Lupron + Zytiga  2/7/2023        PSA = 6.49  Started Salvage Radiation  3/13/23          PSA = 1.7  3/24/23          Radiation Completed.   07/31/23        PSA <0.10, T undetectable  09/08/23        PSA <0.01  10/26/23        PSA <0.01  Held Lupron in 12/2023. On Arianna 500 mg.  03/20/24        PSA <0.01     Review of Systems:   Alfonso is seen with Gi. He is continuing on Zytiga 500 mg daily, he has swelling in the bilateral  lower extremities. He thinks possibly the R>L. This is ongoing since starting Zytiga seems to be gradually getting worse. No rashes or skin changes. 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 He is doing compression socks. He has cut back on his salt and drinking a lot of water. When he elevates his feet at bedtime which seems to be helpful. No significant energy changes. He has continued hot flashes they fluctuate in intensity. Bowels have been normal. They are not constipated or diarrhea. No urinary changes. No new pains in his body. He is taking prednisone 5 mg BID. No recent fevers or chills. No chest pain, shortness of breath, or cough.     Mood is stable.      A comprehensive 14-point review of systems was performed and was all negative except for symptoms noted above in HPI.      MEDICATIONS         Current Outpatient Medications   Medication Instructions      Lupron 22.5 mg subcutaneous, last dose 7/24/23 [facility].  Stopped in 12/2023    Abiraterone (ZYTIGA) 500 mg, Oral, DAILY    Aloe LIQD Oral, DAILY    predniSONE (DELTASONE) 5 mg, Oral, once daily    sildenafil (REVATIO) 20 mg    vitamin D3 (CHOLECALCIFEROL) 2000 units One tablet, Oral, DAILY       PHYSICAL EXAMINATION  Telephone Physical Exam   General: Alert and no distress //Respiratory: No audible wheeze, cough, or shortness of breath // Psychiatric:  Appropriate affect, tone, and pace of words       PATHOLOGY 9/1/2022  Prostate, radical prostatectomy:  - Prostatic adenocarcinoma, acinar type, with extensive cribriform growth.  - Histologic grade: [place] score 4+4=8 (grade group 4). Margins negative.  - Extraprostatic extension identified (nonfocal); no seminal vesicle invasion identified.  - Lymph nodes (pelvic):  0/4 involved.  - AJCC stage:  pT3a pN0 pR0.    PSMA-PET Scan 10/6/2022  Abnormal radiotracer uptake within bilateral pelvic lymph nodes, compatible with metastatic disease.  Abnormal  radiotracer uptake at the site of prostatectomy and prior fluid collection is indeterminate. This is concerning for residual or recurrent disease, but the differential would include inflammatory findings related to the prior anastomotic leak and associated fluid collection.      ASSESSMENT AND PLAN  #Prostate cancer with pelvic alena recurrence s/p RP, received salvage XRT plus ADT/abiraterone  - Recommended ADT plus abiraterone for 2 years. Patient had increased arthralgias, headaches, and worsened mood on 1000 mg abiraterone.  Nine month ago, he reduced abiraterone to 750 mg, and subsequently reduced again to 500 mg. He is tolerating 500 mg well with the exception of peripheral edema.   - His PSA is undetectable at <0.10 with suppressed testosterone on recent checks. We reviewed that this is the expected effect of ADT and abiraterone. Undetectable PSA is not a reason to stop therapies. He unfortunately did not have recent labs prior to our visit today 7/10/24 but asked patient to set these up within the next week or so locally.   - Reviewed with local metastatic disease in the pelvis he is a candidate for both ADT plus abiraterone without bone metastasis per the STAMPEDE trial. Per the trial, men with locally advanced or metastatic prostate cancer who received ADT plus abiraterone had significantly higher rates of overall and failure-free survival than those who received ADT alone.  - He is now taking abiraterone 500 mg per day on an empty stomach given side effects at full dose.  - Continue abiraterone for six more months, until December 2024. He will see Dr. Swan in three months.   - He also decided that he would like to stop his Lupron for QoL reasons, and thus he omitted his 12/2023 dose.    #Peripheral edema  - unable to assess due to video function not working. This may be due to Zytiga but would be strange given it is only now bothersome after several months of therapy. I do feel Alfonso needs an in  person assessment of the peripheral edema to adequately assess next steps and diagnostic imaging pending exam. He lives in [place], therefore he will proceed with scheduling a PCP follow up for further work up.      #Mood  - Continue wellbutrin per PCP.  - Continue antidepressant therapy.   - stable 7.10.24.     25 minutes spent on the date of the encounter doing chart review, review of test results, interpretation of tests, patient visit, and documentation     Alice Hawthorne PA-C

## 2024-07-17 ENCOUNTER — TELEPHONE (OUTPATIENT)
Dept: ONCOLOGY | Facility: CLINIC | Age: 64
End: 2024-07-17
Payer: MEDICAID

## 2024-07-17 NOTE — TELEPHONE ENCOUNTER
Oral Chemotherapy Monitoring Program    Subjective/Objective:  Davion Yang is a 64 year old male contacted by phone for a follow-up visit for oral chemotherapy.  Alfonso Angelo's significant other,  confirms Alfonso is taking the appropriate dose of abiraterone 500mg (2 X250mg) daily. Denies new or worsening side effects, missed doses, and recent hospital or ED visits. Patient has not had any recent medication changes.           2/16/2024    10:00 AM 3/22/2024    10:00 AM 3/27/2024     4:00 PM 4/19/2024    12:00 PM 6/5/2024    10:00 AM 7/12/2024     8:00 AM 7/17/2024    10:00 AM   ORAL CHEMOTHERAPY   Assessment Type Refill Refill;Lab Monitoring Chart Review Quarterly Follow up Refill Chart Review Quarterly Follow up;Other   Diagnosis Code Prostate Cancer Prostate Cancer Prostate Cancer Prostate Cancer Prostate Cancer Prostate Cancer Prostate Cancer   Providers Dr. Sosa Swan   [facility]   Is this patient followed by the [facility] OC team? Yes Yes Yes Yes Yes Yes Yes   Drug Name Zytiga (abiraterone) Zytiga (abiraterone) Zytiga (abiraterone) Zytiga (abiraterone) Zytiga (abiraterone) Zytiga (abiraterone) Zytiga (abiraterone)   Dose 500 mg 500 mg 500 mg 500 mg 500 mg 500 mg 500 mg   Current Schedule Daily Daily Daily Daily Daily Daily Daily   Cycle Details Continuous Continuous Continuous Continuous Continuous Continuous Continuous   Doses missed in last 2 weeks    0      Adherence Assessment    Non-adherent      Reason for Non-adherence    Drug on hold      Adherence Intervention Recommended    None      Adverse Effects    No AE identified during assessment      Any new drug interactions?    No      Is the dose as ordered appropriate for the patient?    Yes      Since the last time we talked, have you been hospitalized or used the emergency room?    No          Last  "PHQ-2 Score on record:        No data to display                Vitals:  BP:   BP Readings from Last 1 Encounters:   09/13/23 119/80     Wt Readings from Last 1 Encounters:   12/13/23 93 kg (205 lb)     Estimated body surface area is 2.14 meters squared as calculated from the following:    Height as of 12/13/23: 1.778 m (5' 10\").    Weight as of 12/13/23: 93 kg (205 lb).    Labs:  No results found for NA within last 30 days.     No results found for K within last 30 days.     No results found for CA within last 30 days.     No results found for Mag within last 30 days.     No results found for Phos within last 30 days.     No results found for ALBUMIN within last 30 days.     No results found for BUN within last 30 days.     No results found for CR within last 30 days.     No results found for AST within last 30 days.     No results found for ALT within last 30 days.     No results found for BILITOTAL within last 30 days.     No results found for WBC within last 30 days.     No results found for HGB within last 30 days.     No results found for PLT within last 30 days.     No results found for ANC within last 30 days.     No results found for ANC within last 30 days.          Assessment/Plan:  Pt is tolerating abiraterone. Continue therapy as planned. Alfonso is overdue for monthly labs. Gi stated they have a close family member going through medical issues and they forgot to schedule labs. Gi said she'll schedule labs for either late this week or early next week. PDC= 0.94. No adherence concerns.     Follow-Up:  7/19: look for monthly labs      Refill Due:  [facility] to deliver on 7/22    Jessica Suazo PharmD  Hematology/Oncology Clinical Pharmacist  [facility]   [phone]      "

## 2024-07-19 ENCOUNTER — MYC MEDICAL ADVICE (OUTPATIENT)
Dept: ONCOLOGY | Facility: CLINIC | Age: 64
End: 2024-07-19
Payer: MEDICAID

## 2024-07-19 NOTE — TELEPHONE ENCOUNTER
Oral Chemotherapy Monitoring Program  Lab Follow Up    Reviewed CMP lab results from 7/18 in [facility].        3/22/2024    10:00 AM 3/27/2024     4:00 PM 4/19/2024    12:00 PM 6/5/2024    10:00 AM 7/12/2024     8:00 AM 7/17/2024    10:00 AM 7/19/2024     9:00 AM   ORAL CHEMOTHERAPY   Assessment Type Refill;Lab Monitoring Chart Review Quarterly Follow up Refill Chart Review Quarterly Follow up;Other Lab Monitoring   Diagnosis Code Prostate Cancer Prostate Cancer Prostate Cancer Prostate Cancer Prostate Cancer Prostate Cancer Prostate Cancer   Providers Dr. Sosa Swan   [facility]   Is this patient followed by the [facility] OC team? Yes Yes Yes Yes Yes Yes Yes   Drug Name Zytiga (abiraterone) Zytiga (abiraterone) Zytiga (abiraterone) Zytiga (abiraterone) Zytiga (abiraterone) Zytiga (abiraterone) Zytiga (abiraterone)   Dose 500 mg 500 mg 500 mg 500 mg 500 mg 500 mg 500 mg   Current Schedule Daily Daily Daily Daily Daily Daily Daily   Cycle Details Continuous Continuous Continuous Continuous Continuous Continuous Continuous   Doses missed in last 2 weeks   0   1    Adherence Assessment   Non-adherent   Adherent    Reason for Non-adherence   Drug on hold       Adherence Intervention Recommended   None       Adverse Effects   No AE identified during assessment   No AE identified during assessment    Any new drug interactions?   No   No    Is the dose as ordered appropriate for the patient?   Yes   Yes    Since the last time we talked, have you been hospitalized or used the emergency room?   No   No        Labs:  No results found for NA within last 30 days.     No results found for K within last 30 days.     No results found for CA within last 30 days.     No results found for Mag within last 30 days.     No results found for Phos within last 30 days.     No results found  for ALBUMIN within last 30 days.     No results found for BUN within last 30 days.     No results found for CR within last 30 days.     No results found for AST within last 30 days.     No results found for ALT within last 30 days.     No results found for BILITOTAL within last 30 days.     No results found for WBC within last 30 days.     No results found for HGB within last 30 days.     No results found for PLT within last 30 days.     No results found for ANC within last 30 days.     No results found for ANC within last 30 days.        Assessment & Plan:  No concerning abnormalities. Continue abiraterone as planned.    iiMondet sent to patient with results.    Follow-Up:  8/15: monthly labs in [facility]    Grace Myhre, PharmD  Hematology/Oncology Pharmacist  [facility]  [facility]  [phone]

## 2024-08-16 ENCOUNTER — TELEPHONE (OUTPATIENT)
Dept: ONCOLOGY | Facility: CLINIC | Age: 64
End: 2024-08-16
Payer: MEDICAID

## 2024-08-16 NOTE — TELEPHONE ENCOUNTER
"Pt was a no show for his provider visit/labs (in [facility]) yesterday. I called Alfonso and asked him to schedule his monthly labs and he stated \"I don't do that\". I called [facility] and asked them to reach out to Alfonso to schedule labs at the [place] location within the next week.    Jessica Suazo PharmD  Hematology/Oncology Clinical Pharmacist  [facility]   [phone]    "

## 2024-09-03 DIAGNOSIS — C77.9 REGIONAL LYMPH NODE METASTASIS PRESENT (H): ICD-10-CM

## 2024-09-03 DIAGNOSIS — C61 PROSTATE CANCER (H): Primary | ICD-10-CM

## 2024-09-12 DIAGNOSIS — C61 PROSTATE CANCER (H): Primary | ICD-10-CM

## 2024-09-12 DIAGNOSIS — C77.9 REGIONAL LYMPH NODE METASTASIS PRESENT (H): ICD-10-CM

## 2024-09-12 RX ORDER — PREDNISONE 5 MG/1
5 TABLET ORAL 2 TIMES DAILY
Qty: 60 TABLET | Refills: 2 | Status: SHIPPED | OUTPATIENT
Start: 2024-09-19

## 2024-09-12 RX ORDER — ABIRATERONE ACETATE 250 MG/1
500 TABLET ORAL DAILY
Qty: 60 TABLET | Refills: 2 | Status: SHIPPED | OUTPATIENT
Start: 2024-09-19

## 2024-10-30 ENCOUNTER — VIRTUAL VISIT (OUTPATIENT)
Dept: ONCOLOGY | Facility: CLINIC | Age: 64
End: 2024-10-30
Attending: INTERNAL MEDICINE
Payer: MEDICAID

## 2024-10-30 VITALS — HEIGHT: 70 IN | BODY MASS INDEX: 29.41 KG/M2

## 2024-10-30 DIAGNOSIS — C61 PROSTATE CANCER (H): Primary | ICD-10-CM

## 2024-10-30 PROCEDURE — 99213 OFFICE O/P EST LOW 20 MIN: CPT | Mod: 95

## 2024-10-30 ASSESSMENT — PAIN SCALES - GENERAL: PAINLEVEL_OUTOF10: NO PAIN (0)

## 2024-10-30 NOTE — LETTER
10/30/2024      Davion Yang  [address] [facility] [address]  [place] [address]      Dear Colleague,    Thank you for referring your patient, Davion Yang, to the [facility]. Please see a copy of my visit note below.    Oct 30, 2024   FOLLOW UP VISIT  -  Video visit     Reason for Visit:  Pelvic recurrence of prostate cancer post-RP, received salvage XRT plus ADT/abiraterone (500 mg).     History of Present Illness:   Mr. Yang is a 63-year-old man with a history of prostate cancer as well as esophageal adenocarcinoma (8/2013).  5/1/2018: Prostate biopsy: adenocarcinoma [place] 4+4=8.  PSA was 12.9 ng/mL.  9/1/2022: Prostatectomy (pT3a N0 R0), pathologic evaluation demonstrated [place] 4+4=8; four lymph nodes were normal.  Persistent PSA elevation post-operatively.  10/6/2022: PSMA-PET demonstrated increased uptake in the prostate bed and bilateral pelvic lymph nodes.   Treated with salvage XRT plus ADT/abiraterone.  Currently on abiraterone 500 mg. Stopped ADT in 12/2023.    Genomics ([facility]): TMPRSS2-ERG fusion, CTNNB1 (p.T41A), CDKN1B deletion; MS - stable; TMB 3 muts/Mb; gLOH 6%.     PSA levels  5/1/2018        PSA 12.92  5/02/2019      PSA 20.77  7/16/2020      PSA 34.63  1/27/2022      PSA 37.84  9/29/2022      PSA 13.21  1/2023           START Lupron + Zytiga  2/7/2023        PSA = 6.49  Started Salvage Radiation  3/13/23          PSA = 1.7  3/24/23          Radiation Completed.   07/31/23        PSA <0.10, T undetectable  09/08/23        PSA <0.01  10/26/23        PSA <0.01  Held Lupron in 12/2023. On Arianna 500 mg.  03/20/24        PSA <0.01  07/18/24        PSA 0.00  10/23/24        PSA 0.     Review of Systems:   Alfonso is seen with Gi. He is continuing on Zytiga 500 mg daily. He has intermittent pain in the right leg with a bump behind the knee or calf. He does has continued swelling in the ankles R>L. No new skin changes. No bowel or bladder changes. He does have frequent hot flashes that  wax and wane. Mood is stable. No other new pains in the body. Energy is stable but on the lower side. He is semi retired now. No chest pain, shortness of breath, or cough.     A comprehensive 14-point review of systems was performed and was all negative except for symptoms noted above in HPI.      MEDICATIONS         Current Outpatient Medications   Medication Instructions       Lupron 22.5 mg subcutaneous, last dose 7/24/23 [facility].  Stopped in 12/2023     Abiraterone (ZYTIGA) 500 mg, Oral, DAILY     Aloe LIQD Oral, DAILY     predniSONE (DELTASONE) 5 mg, Oral, once daily     sildenafil (REVATIO) 20 mg     vitamin D3 (CHOLECALCIFEROL) 2000 units One tablet, Oral, DAILY       PHYSICAL EXAMINATION  Video physical exam  General: Patient appears well in no acute distress laying down.   Skin: No visualized rash or lesions on visualized skin  Eyes: EOMI, no erythema, sclera icterus or discharge noted  Resp: Appears to be breathing comfortably without accessory muscle usage, speaking in full sentences, no cough  MSK: Appears to have normal range of motion based on visualized movements  Neurologic: No apparent tremors, facial movements symmetric  Psych: affect bright, alert and oriented    Labs:   Contains abnormal data COMPREHENSIVE METABOLIC PANEL  Order: [id number]  Component  Ref Range & Units 7 d ago   Sodium  134 - 143 mEq/L 145 High    Potassium  3.4 - 5.1 mEq/L 5.1   Chloride  99 - 110 mEq/L 111 High    Carbon Dioxide  19 - 29 mEq/L 26   Anion Gap  3.0 - 15.0 mEq/L 8   Blood Urea Nitrogen  5 - 24 mg/dL 24   Creatinine  0.70 - 1.20 mg/dL 1.63 High    Glomerular Filtration Rate  >60 mL/min/1.73 m*2 47 Low    Comment: Risk of cardiovascular disease increases when GFR is abnormal; persistently reduced GFR values are a specific indication of CKD. This calculation uses CKD-EPI 2021 equation without adjustment for race; it has not been validated in pregnant women.   Calcium  8.4 - 10.5 mg/dL 9.5    Glucose  70 - 99 mg/dL 95   Protein, Total  6.0 - 8.0 g/dL 6.8   Albumin  3.5 - 5.0 g/dL 3.9   Alkaline Phosphatase  40 - 150 IU/L 122   Aspartate Aminotransferase  10 - 40 IU/L 17   Alanine Aminotransferase  6 - 40 IU/L 15   Bilirubin, Total  0.2 - 1.2 mg/dL 0.4   Resulting Agency [facility]   Narrative  Performed by [facility]  Current ADA criteria for Glucose:      Normal: 70-99 mg/dL      Impaired Fasting Glucose: 100-125 mg/dL      Diabetes Mellitus: at or above 126 mg/dL  The diagnosis of diabetes must be confirmed on a subsequent day by measuring Fasting Plasma Glucose, 2-hr PG or random plasma glucose (if symptoms are present).    Specimen Collected: 10/23/24  1:15 PM    Performed by: [facility] Last Resulted: 10/23/24  4:50 PM   Received From: [facility]  Result Received: 10/26/24 10:39 AM    View Encounter           ASSESSMENT AND PLAN  #Prostate cancer with pelvic alena recurrence s/p RP, received salvage XRT plus ADT/abiraterone  - Recommended ADT plus abiraterone for 2 years. Patient had increased arthralgias, headaches, and worsened mood on 1000 mg abiraterone.  Nine month ago, he reduced abiraterone to 750 mg, and subsequently reduced again to 500 mg. He is tolerating 500 mg well with the exception of peripheral edema.   - His PSA has been undetectable on recent checks. We discussed his metabolic panel is generally stable but his creatinine was mildly increased from his baseline. Given his sodium and chloride were also mildly increased I suspect there is a component of dehydration. I encouraged Alfonso to keep up on oral intake.   - We reviewed the plan to continue abiraterone for just over one month, until December 2024. We discussed filling his November prescription that should get him to early-mid December and then can discontinue. He will see Dr. Swan around that time and we discussed generally we will  continue labs every 3 months with a PSA and testosterone with provider visit for review. Patient and significant other are concerned about the sexual side effects of treatment but are agreeable to continue at this time. We discussed it will take time for his testosterone to start to rise after discontinuing therapy.   - He also decided that he would like to stop his Lupron for QoL reasons, and thus he omitted his 12/2023 dose.    #Peripheral edema, R>L.   #Right posterior knee pain.   - Ongoing edema R >L reported from 07/2024. I expressed the prudence of an in person assessment to guide the need for further work up. Patient never followed up with PCP. I am again expressed the need for PCP follow up locally. Patient agreeable.     #Mood  - Continue wellbutrin per PCP.  - Continue antidepressant therapy.   - stable 10/30/2024.    20 minutes spent on the date of the encounter doing chart review, review of test results, interpretation of tests, patient visit, and documentation        Alice Hawthorne PA-C          Virtual Visit Details    Type of service:  Video Visit   Video Start Time:  12:32 pm  Video End Time: 12:45 pm    Originating Location (pt. Location): Home  Distant Location (provider location):  Off-site  Platform used for Video Visit: Joey      Again, thank you for allowing me to participate in the care of your patient.        Sincerely,        Alice Hawthorne PA-C

## 2024-10-30 NOTE — NURSING NOTE
Current patient location: [address] [place] [id number]    Is the patient currently in the state of [place]? YES    Visit mode:VIDEO    If the visit is dropped, the patient can be reconnected by: VIDEO VISIT: Send to e-mail at: [email or web address] Android POS    Will anyone else be joining the visit? NO  (If patient encounters technical issues they should call [phone]    Are changes needed to the allergy or medication list? No    Are refills needed on medications prescribed by this physician? NO    Rooming Documentation:  Questionnaire(s) completed    Reason for visit: OLEKSANDR KAN

## 2024-10-30 NOTE — PROGRESS NOTES
Oct 30, 2024   FOLLOW UP VISIT  -  Video visit     Reason for Visit:  Pelvic recurrence of prostate cancer post-RP, received salvage XRT plus ADT/abiraterone (500 mg).     History of Present Illness:   Mr. Yang is a 63-year-old man with a history of prostate cancer as well as esophageal adenocarcinoma (8/2013).  5/1/2018: Prostate biopsy: adenocarcinoma [place] 4+4=8.  PSA was 12.9 ng/mL.  9/1/2022: Prostatectomy (pT3a N0 R0), pathologic evaluation demonstrated [place] 4+4=8; four lymph nodes were normal.  Persistent PSA elevation post-operatively.  10/6/2022: PSMA-PET demonstrated increased uptake in the prostate bed and bilateral pelvic lymph nodes.   Treated with salvage XRT plus ADT/abiraterone.  Currently on abiraterone 500 mg. Stopped ADT in 12/2023.    Genomics ([facility]): TMPRSS2-ERG fusion, CTNNB1 (p.T41A), CDKN1B deletion; MS - stable; TMB 3 muts/Mb; gLOH 6%.     PSA levels  5/1/2018        PSA 12.92  5/02/2019      PSA 20.77  7/16/2020      PSA 34.63  1/27/2022      PSA 37.84  9/29/2022      PSA 13.21  1/2023           START Lupron + Zytiga  2/7/2023        PSA = 6.49  Started Salvage Radiation  3/13/23          PSA = 1.7  3/24/23          Radiation Completed.   07/31/23        PSA <0.10, T undetectable  09/08/23        PSA <0.01  10/26/23        PSA <0.01  Held Lupron in 12/2023. On Arianna 500 mg.  03/20/24        PSA <0.01  07/18/24        PSA 0.00  10/23/24        PSA 0.     Review of Systems:   Alfonso is seen with Gi. He is continuing on Zytiga 500 mg daily. He has intermittent pain in the right leg with a bump behind the knee or calf. He does has continued swelling in the ankles R>L. No new skin changes. No bowel or bladder changes. He does have frequent hot flashes that wax and wane. Mood is stable. No other new pains in the body. Energy is stable but on the lower side. He is semi retired now. No chest pain, shortness of breath, or cough.     A comprehensive 14-point review of systems was performed and  was all negative except for symptoms noted above in HPI.      MEDICATIONS         Current Outpatient Medications   Medication Instructions      Lupron 22.5 mg subcutaneous, last dose 7/24/23 [facility].  Stopped in 12/2023    Abiraterone (ZYTIGA) 500 mg, Oral, DAILY    Aloe LIQD Oral, DAILY    predniSONE (DELTASONE) 5 mg, Oral, once daily    sildenafil (REVATIO) 20 mg    vitamin D3 (CHOLECALCIFEROL) 2000 units One tablet, Oral, DAILY       PHYSICAL EXAMINATION  Video physical exam  General: Patient appears well in no acute distress laying down.   Skin: No visualized rash or lesions on visualized skin  Eyes: EOMI, no erythema, sclera icterus or discharge noted  Resp: Appears to be breathing comfortably without accessory muscle usage, speaking in full sentences, no cough  MSK: Appears to have normal range of motion based on visualized movements  Neurologic: No apparent tremors, facial movements symmetric  Psych: affect bright, alert and oriented    Labs:   Contains abnormal data COMPREHENSIVE METABOLIC PANEL  Order: [id number]  Component  Ref Range & Units 7 d ago   Sodium  134 - 143 mEq/L 145 High    Potassium  3.4 - 5.1 mEq/L 5.1   Chloride  99 - 110 mEq/L 111 High    Carbon Dioxide  19 - 29 mEq/L 26   Anion Gap  3.0 - 15.0 mEq/L 8   Blood Urea Nitrogen  5 - 24 mg/dL 24   Creatinine  0.70 - 1.20 mg/dL 1.63 High    Glomerular Filtration Rate  >60 mL/min/1.73 m*2 47 Low    Comment: Risk of cardiovascular disease increases when GFR is abnormal; persistently reduced GFR values are a specific indication of CKD. This calculation uses CKD-EPI 2021 equation without adjustment for race; it has not been validated in pregnant women.   Calcium  8.4 - 10.5 mg/dL 9.5   Glucose  70 - 99 mg/dL 95   Protein, Total  6.0 - 8.0 g/dL 6.8   Albumin  3.5 - 5.0 g/dL 3.9   Alkaline Phosphatase  40 - 150 IU/L 122   Aspartate Aminotransferase  10 - 40 IU/L 17   Alanine Aminotransferase  6 - 40 IU/L 15   Bilirubin, Total  0.2 -  1.2 mg/dL 0.4   Resulting Agency [facility]   Narrative  Performed by [facility]  Current ADA criteria for Glucose:      Normal: 70-99 mg/dL      Impaired Fasting Glucose: 100-125 mg/dL      Diabetes Mellitus: at or above 126 mg/dL  The diagnosis of diabetes must be confirmed on a subsequent day by measuring Fasting Plasma Glucose, 2-hr PG or random plasma glucose (if symptoms are present).    Specimen Collected: 10/23/24  1:15 PM    Performed by: [facility] CLIN[facility] Last Resulted: 10/23/24  4:50 PM   [facility]  Result Received: 10/26/24 10:39 AM    View Encounter           ASSESSMENT AND PLAN  #Prostate cancer with pelvic alena recurrence s/p RP, received salvage XRT plus ADT/abiraterone  - Recommended ADT plus abiraterone for 2 years. Patient had increased arthralgias, headaches, and worsened mood on 1000 mg abiraterone.  Nine month ago, he reduced abiraterone to 750 mg, and subsequently reduced again to 500 mg. He is tolerating 500 mg well with the exception of peripheral edema.   - His PSA has been undetectable on recent checks. We discussed his metabolic panel is generally stable but his creatinine was mildly increased from his baseline. Given his sodium and chloride were also mildly increased I suspect there is a component of dehydration. I encouraged Alfonso to keep up on oral intake.   - We reviewed the plan to continue abiraterone for just over one month, until December 2024. We discussed filling his November prescription that should get him to early-mid December and then can discontinue. He will see Dr. Swan around that time and we discussed generally we will continue labs every 3 months with a PSA and testosterone with provider visit for review. Patient and significant other are concerned about the sexual side effects of treatment but are agreeable to continue at this time. We discussed it will take time for  his testosterone to start to rise after discontinuing therapy.   - He also decided that he would like to stop his Lupron for QoL reasons, and thus he omitted his 12/2023 dose.    #Peripheral edema, R>L.   #Right posterior knee pain.   - Ongoing edema R >L reported from 07/2024. I expressed the prudence of an in person assessment to guide the need for further work up. Patient never followed up with PCP. I am again expressed the need for PCP follow up locally. Patient agreeable.     #Mood  - Continue wellbutrin per PCP.  - Continue antidepressant therapy.   - stable 10/30/2024.    20 minutes spent on the date of the encounter doing chart review, review of test results, interpretation of tests, patient visit, and documentation        Alice Hawthorne PA-C

## 2024-10-30 NOTE — PROGRESS NOTES
Virtual Visit Details    Type of service:  Video Visit   Video Start Time:  12:32 pm  Video End Time: 12:45 pm    Originating Location (pt. Location): Home  Distant Location (provider location):  Off-site  Platform used for Video Visit: Joey

## 2024-12-05 NOTE — TELEPHONE ENCOUNTER
"Oral Chemotherapy Monitoring Program    Subjective/Objective:  Davion Yang is a 62 year old male contacted by phone for an initial visit for oral chemotherapy education.      ORAL CHEMOTHERAPY 12/26/2022 12/28/2022   Assessment Type Initial Work up New Teach   Diagnosis Code Prostate Cancer Prostate Cancer   Providers Dr. Arun Dias   [facility]   Drug Name Zytiga (abiraterone) Zytiga (abiraterone)   Dose 1,000 mg 1,000 mg   Current Schedule Daily Daily   Cycle Details Continuous Continuous   Any new drug interactions? No -   Is the dose as ordered appropriate for the patient? Yes -       Vitals:  BP:   BP Readings from Last 1 Encounters:   11/07/22 135/78     Wt Readings from Last 1 Encounters:   12/26/22 88.5 kg (195 lb)     Estimated body surface area is 2.09 meters squared as calculated from the following:    Height as of 12/26/22: 1.778 m (5' 10\").    Weight as of 12/26/22: 88.5 kg (195 lb).      Labs:  No results found for NA within last 30 days.     No results found for K within last 30 days.     No results found for CA within last 30 days.     No results found for Mag within last 30 days.     No results found for Phos within last 30 days.     No results found for ALBUMIN within last 30 days.     No results found for BUN within last 30 days.     No results found for CR within last 30 days.       No results found for AST within last 30 days.     No results found for ALT within last 30 days.     No results found for BILITOTAL within last 30 days.       No results found for WBC within last 30 days.     No results found for HGB within last 30 days.     No results found for PLT within last 30 days.     No results found for ANC within last 30 days.       PSA 18.97  12/28/22    Assessment:  Patient is appropriate to start therapy.  Informed Davion that he needs baseline labs.  He will go in 12/28 or 12/29 at [facility]  Informed him to get his blood pressure checked " Abdomen , soft, nontender, nondistended , no guarding or rigidity , no masses palpable , normal bowel sounds , Liver and Spleen , no hepatomegaly present , no hepatosplenomegaly , liver nontender , spleen not palpable while on therapy    Plan:  Basic chemotherapy teaching was reviewed with the patient including indication, start date of therapy, dose, administration, adverse effects, missed doses, food and drug interactions, monitoring, side effect management, office contact information, and safe handling. Written materials were provided and all questions answered.    Marcelo Bell, PharmD, BCOP  December 28, 2022

## 2024-12-08 DIAGNOSIS — C61 PROSTATE CANCER (H): Primary | ICD-10-CM

## 2024-12-08 DIAGNOSIS — C77.9 REGIONAL LYMPH NODE METASTASIS PRESENT (H): ICD-10-CM

## 2024-12-14 NOTE — PROGRESS NOTES
Virtual Visit Details    Type of service:  Video Visit   Originating Location (pt. Location):  Home    Distant Location (provider location):  [facility]  Platform used for Video Visit:  Joey    ---------------------------------------------------------------------------------------------------------------------------    FOLLOW UP VISIT  -  TELEMEDICINE         Reason for Visit:  Pelvic recurrence post-RP, received salvage XRT plus ADT/abiraterone (500 mg).     History of Present Illness:   Mr. Yang is a 64-year-old man with a history of prostate cancer as well as esophageal adenocarcinoma (8/2013).  5/1/2018: Prostate biopsy: adenocarcinoma [place] 4+4=8.  PSA was 12.9 ng/mL.  9/1/2022: Prostatectomy (pT3a N0 R0), pathologic evaluation demonstrated [place] 4+4=8; four lymph nodes were normal.  Persistent PSA elevation post-operatively.  10/6/2022: PSMA-PET demonstrated increased uptake in the prostate bed and bilateral pelvic lymph nodes; no distant mets.   Treated with salvage XRT plus ADT/abiraterone.  Abiraterone dose is 500 mg.    Genomics ([facility]): TMPRSS2-ERG fusion, CTNNB1 (p.T41A), CDKN1B deletion; MS - stable; TMB 3 muts/Mb; gLOH 6%.     PSA levels  5/1/2018        PSA 12.92  9/29/2022      PSA 13.21  1/2023           START Lupron + Zytiga  2/7/2023        PSA = 6.49  Started Salvage Radiation  3/13/23          PSA = 1.70  3/24/23          Radiation Completed.  07/31/23        PSA <0.10, T undetectable  09/08/23        PSA <0.01  10/26/23        PSA <0.01  Held Lupron in 12/2023. On Zytiga 500 mg.  03/20/24        PSA <0.01  07/18/24        PSA <0.01  10/23/24        PSA <0.01  12/12/24        PSA <0.01  Stop Zytiga     Review of Systems:  Alfonso is continuing on Zytiga 500 mg daily (and prednisone 5 mg BID), he has swelling in the bilateral lower extremities. He thinks possibly the R>L. This is ongoing since starting Zytiga seems to be gradually getting worse. No rashes or skin  changes. 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 He is doing compression socks. He has cut back on his salt and drinking a lot of water. When he elevates his feet at bedtime which seems to be helpful. No significant energy changes. He has continued hot flashes they fluctuate in intensity. Bowels have been normal. There is no constipation or diarrhea. No urinary changes. No new pains in his body. No recent fevers or chills. No chest pain, shortness of breath, or cough. Mood is stable.  A comprehensive 14-point review of systems was performed and was all negative except for symptoms noted above in HPI.  ECOG=1.  Pain 1/10.     PMHx:  Prostate cancer with pelvic recurrence s/p RP  Esophageal carcinoma (2013)     MEDICATIONS     Lupron 22.5 mg subcutaneous, last dose 7/24/23. Received this at [facility].   abiraterone (ZYTIGA) 500 mg, Oral, DAILY.  Last dose 12/16/24   predniSONE (DELTASONE) 5 mg, oral, Once daily  Taper started on 12/16/24.   aloe LIQD Oral, DAILY   sildenafil (REVATIO) 20 mg   vitamin D3 (CHOLECALCIFEROL) 2000 units One tablet, Oral, DAILY      PHYSICAL EXAMINATION  General: Patient appears well. Normal body habitus  HEENT: Normocephalic, atraumatic.  Cardiac: Regular rate and rhythm. No murmurs, rub, or extra heart sounds  Lungs: Clear to auscultation bilaterally  Abdomen: Normal bowel sounds. No tenderness to palpation  Extremities: No pedal/ankle edema  Skin: no rash or dermatitis     PSMA-PET Scan (10/6/2022):  Abnormal radiotracer uptake within bilateral pelvic lymph nodes, compatible with metastatic disease.  Abnormal radiotracer uptake at the site of prostatectomy and prior fluid collection is indeterminate.  This is concerning for residual or recurrent disease, but the differential would include inflammatory findings related to the prior anastomotic leak and associated fluid collection.     PATHOLOGY (9/1/2022):  Prostate, radical  prostatectomy:  - Prostatic adenocarcinoma, acinar type, with extensive cribriform growth.  - Histologic grade: [place] score 4+4=8 (grade group 4). Margins negative.  - Extraprostatic extension identified (nonfocal); no seminal vesicle invasion identified.  - Lymph nodes (pelvic):  none (0/4) involved.  - AJCC stage:  pT3a pN0 pR0.        ASSESSMENT AND PLAN  #Prostate cancer with pelvic alena recurrence s/p RP, received salvage XRT plus ADT/abiraterone  - Recommended ADT plus abiraterone for 2 years. Patient had increased arthralgias, headaches, and worsened mood on 1000 mg abiraterone. He has been holding since 06/17/23.  Nine month ago, he re-started abiraterone 750 mg, subsequently reduced to 500 mg.  - His PSA is undetectable at <0.10 ng/mL with suppressed testosterone. We reviewed that this is the expected effect of ADT and abiraterone.  - Reviewed with local metastatic disease in the pelvis he is a candidate for both ADT plus abiraterone without bone metastasis per the STAMPEDE trial. Per that trial, men with locally advanced or metastatic prostate cancer who received ADT plus abiraterone had significantly higher rates of overall and failure-free survival than those who received ADT alone.  - He is now taking abiraterone 500 mg per day on an empty stomach given side effects at full dose.  - Our plan had been to continue abiraterone until December 2024.  I have instructed him to stop Zytiga today (12/16/2024).  - He had previously decided that he would like to stop his Lupron for QoL reasons, and thus he omitted his 12/2023 dose.  - In December 2024, he will stop all systemic therapy. We discussed an appropriate prednisone taper.  - He will take prednisone 5 mg every other day for 30 days, then prednisone 5 mg every third day for 30 more days.   - PSA and testosterone will be measured every 3 months moving forward.  We will do a video visit in March 2025.     #Erectile dysfunction  - Has tried Sildenafil and  Trimix. Discussed I refer patients to PCP or urology to help with management.  - Would like to establish care with urology at [place].     #Mood  - Continue Wellbutrin per PCP.  - Continue antidepressant therapy.      A total of 40 minutes were spent on this patient on the date of the encounter conducting chart review, review of test results, interpretation of tests, patient visit, documentation and discussion with other provider(s). The patient was given the opportunity to ask multiple questions today, all of which were answered to his satisfaction.     Ulices Swan M.D.

## 2024-12-16 ENCOUNTER — VIRTUAL VISIT (OUTPATIENT)
Dept: ONCOLOGY | Facility: CLINIC | Age: 64
End: 2024-12-16
Attending: INTERNAL MEDICINE
Payer: COMMERCIAL

## 2024-12-16 ENCOUNTER — DOCUMENTATION ONLY (OUTPATIENT)
Dept: ONCOLOGY | Facility: CLINIC | Age: 64
End: 2024-12-16

## 2024-12-16 DIAGNOSIS — C61 MALIGNANT NEOPLASM OF PROSTATE (H): Primary | ICD-10-CM

## 2024-12-16 PROCEDURE — 99215 OFFICE O/P EST HI 40 MIN: CPT | Mod: 95 | Performed by: INTERNAL MEDICINE

## 2024-12-16 NOTE — NURSING NOTE
Current patient location: Patient declined to provide     Is the patient currently in the state of [place]? YES    Visit mode:VIDEO    If the visit is dropped, the patient can be reconnected by:VIDEO VISIT: Text to cell phone:   Telephone Information:   Mobile [phone]       Will anyone else be joining the visit? NO  (If patient encounters technical issues they should call [phone])    Are changes needed to the allergy or medication list? Pt stated no changes to allergies and Pt stated no med changes    Are refills needed on medications prescribed by this physician? NO    Rooming Documentation:  Questionnaire(s) completed    Reason for visit: RECHECK    Marcos KAN

## 2024-12-16 NOTE — LETTER
12/16/2024      Davion Yang  [address] [place] [address]      Dear Colleague,    Thank you for referring your patient, Davion Yang, to the [facility]. Please see a copy of my visit note below.      Virtual Visit Details    Type of service:  Video Visit   Originating Location (pt. Location):  Home    Distant Location (provider location):  [facility]  Platform used for Video Visit:  AmWell    ---------------------------------------------------------------------------------------------------------------------------    FOLLOW UP VISIT  -  TELEMEDICINE         Reason for Visit:  Pelvic recurrence post-RP, received salvage XRT plus ADT/abiraterone (500 mg).     History of Present Illness:   Mr. Yang is a 64-year-old man with a history of prostate cancer as well as esophageal adenocarcinoma (8/2013).  5/1/2018: Prostate biopsy: adenocarcinoma [place] 4+4=8.  PSA was 12.9 ng/mL.  9/1/2022: Prostatectomy (pT3a N0 R0), pathologic evaluation demonstrated [place] 4+4=8; four lymph nodes were normal.  Persistent PSA elevation post-operatively.  10/6/2022: PSMA-PET demonstrated increased uptake in the prostate bed and bilateral pelvic lymph nodes; no distant mets.   Treated with salvage XRT plus ADT/abiraterone.  Abiraterone dose is 500 mg.    Genomics ([facility]): TMPRSS2-ERG fusion, CTNNB1 (p.T41A), CDKN1B deletion; MS - stable; TMB 3 muts/Mb; gLOH 6%.     PSA levels  5/1/2018        PSA 12.92  9/29/2022      PSA 13.21  1/2023           START Lupron + Zytiga  2/7/2023        PSA = 6.49  Started Salvage Radiation  3/13/23          PSA = 1.70  3/24/23          Radiation Completed.  07/31/23        PSA <0.10, T undetectable  09/08/23        PSA <0.01  10/26/23        PSA <0.01  Held Lupron in 12/2023. On Zytiga 500 mg.  03/20/24        PSA <0.01  07/18/24        PSA <0.01  10/23/24        PSA <0.01  12/12/24        PSA <0.01  Stop Zytiga     Review of  Systems:  Alfonso is continuing on Zytiga 500 mg daily (and prednisone 5 mg BID), he has swelling in the bilateral lower extremities. He thinks possibly the R>L. This is ongoing since starting Zytiga seems to be gradually getting worse. No rashes or skin changes. 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 He is doing compression socks. He has cut back on his salt and drinking a lot of water. When he elevates his feet at bedtime which seems to be helpful. No significant energy changes. He has continued hot flashes they fluctuate in intensity. Bowels have been normal. There is no constipation or diarrhea. No urinary changes. No new pains in his body. No recent fevers or chills. No chest pain, shortness of breath, or cough. Mood is stable.  A comprehensive 14-point review of systems was performed and was all negative except for symptoms noted above in HPI.  ECOG=1.  Pain 1/10.     PMHx:  Prostate cancer with pelvic recurrence s/p RP  Esophageal carcinoma (2013)     MEDICATIONS      Lupron 22.5 mg subcutaneous, last dose 7/24/23. Received this at [facility].    abiraterone (ZYTIGA) 500 mg, Oral, DAILY.  Last dose 12/16/24    predniSONE (DELTASONE) 5 mg, oral, Once daily  Taper started on 12/16/24.    aloe LIQD Oral, DAILY    sildenafil (REVATIO) 20 mg    vitamin D3 (CHOLECALCIFEROL) 2000 units One tablet, Oral, DAILY      PHYSICAL EXAMINATION  General: Patient appears well. Normal body habitus  HEENT: Normocephalic, atraumatic.  Cardiac: Regular rate and rhythm. No murmurs, rub, or extra heart sounds  Lungs: Clear to auscultation bilaterally  Abdomen: Normal bowel sounds. No tenderness to palpation  Extremities: No pedal/ankle edema  Skin: no rash or dermatitis     PSMA-PET Scan (10/6/2022):  Abnormal radiotracer uptake within bilateral pelvic lymph nodes, compatible with metastatic disease.  Abnormal radiotracer uptake at the site of prostatectomy and prior fluid  collection is indeterminate.  This is concerning for residual or recurrent disease, but the differential would include inflammatory findings related to the prior anastomotic leak and associated fluid collection.     PATHOLOGY (9/1/2022):  Prostate, radical prostatectomy:  - Prostatic adenocarcinoma, acinar type, with extensive cribriform growth.  - Histologic grade: [place] score 4+4=8 (grade group 4). Margins negative.  - Extraprostatic extension identified (nonfocal); no seminal vesicle invasion identified.  - Lymph nodes (pelvic):  none (0/4) involved.  - AJCC stage:  pT3a pN0 pR0.        ASSESSMENT AND PLAN  #Prostate cancer with pelvic alena recurrence s/p RP, received salvage XRT plus ADT/abiraterone  - Recommended ADT plus abiraterone for 2 years. Patient had increased arthralgias, headaches, and worsened mood on 1000 mg abiraterone. He has been holding since 06/17/23.  Nine month ago, he re-started abiraterone 750 mg, subsequently reduced to 500 mg.  - His PSA is undetectable at <0.10 ng/mL with suppressed testosterone. We reviewed that this is the expected effect of ADT and abiraterone.  - Reviewed with local metastatic disease in the pelvis he is a candidate for both ADT plus abiraterone without bone metastasis per the STAMPEDE trial. Per that trial, men with locally advanced or metastatic prostate cancer who received ADT plus abiraterone had significantly higher rates of overall and failure-free survival than those who received ADT alone.  - He is now taking abiraterone 500 mg per day on an empty stomach given side effects at full dose.  - Our plan had been to continue abiraterone until December 2024.  I have instructed him to stop Zytiga today (12/16/2024).  - He had previously decided that he would like to stop his Lupron for QoL reasons, and thus he omitted his 12/2023 dose.  - In December 2024, he will stop all systemic therapy. We discussed an appropriate prednisone taper.  - He will take prednisone  5 mg every other day for 30 days, then prednisone 5 mg every third day for 30 more days.   - PSA and testosterone will be measured every 3 months moving forward.  We will do a video visit in March 2025.     #Erectile dysfunction  - Has tried Sildenafil and Trimix. Discussed I refer patients to PCP or urology to help with management.  - Would like to establish care with urology at [place].     #Mood  - Continue Wellbutrin per PCP.  - Continue antidepressant therapy.      A total of 40 minutes were spent on this patient on the date of the encounter conducting chart review, review of test results, interpretation of tests, patient visit, documentation and discussion with other provider(s). The patient was given the opportunity to ask multiple questions today, all of which were answered to his satisfaction.     Ulices Swan M.D.      Again, thank you for allowing me to participate in the care of your patient.        Sincerely,        Ulices Swan MD

## 2024-12-16 NOTE — PROGRESS NOTES
[facility] Oral Chemotherapy Monitoring Program    Thank you for the opportunity to be a part in the care of this patient's oral chemotherapy. The oncology pharmacy will no longer be following this patient for oral chemotherapy. If there are any questions or the plan changes, feel free to contact us.        6/5/2024    10:00 AM 7/12/2024     8:00 AM 7/17/2024    10:00 AM 7/19/2024     9:00 AM 8/16/2024    10:00 AM 9/12/2024     2:00 PM 12/16/2024     2:00 PM   ORAL CHEMOTHERAPY   Assessment Type Refill Chart Review Quarterly Follow up;Other Lab Monitoring Other Refill Discontinuation   Stop Date       12/16/2024   Reason for Discontinuation       Therapy complete   Diagnosis Code Prostate Cancer Prostate Cancer Prostate Cancer Prostate Cancer Prostate Cancer Prostate Cancer Prostate Cancer   Providers Dr. Sosa Swan   Clinic Name/Location [facility]   Is this patient followed by the [facility] OC team? Yes Yes Yes Yes Yes Yes No   Drug Name Zytiga (abiraterone) Zytiga (abiraterone) Zytiga (abiraterone) Zytiga (abiraterone) Zytiga (abiraterone) Zytiga (abiraterone) Zytiga (abiraterone)   Dose 500 mg 500 mg 500 mg 500 mg 500 mg 500 mg    Current Schedule Daily Daily Daily Daily Daily Daily    Cycle Details Continuous Continuous Continuous Continuous Continuous Continuous    Doses missed in last 2 weeks   1       Adherence Assessment   Adherent       Adverse Effects   No AE identified during assessment       Any new drug interactions?   No       Is the dose as ordered appropriate for the patient?   Yes       Since the last time we talked, have you been hospitalized or used the emergency room?   No           No Reid PharmD  Oral Chemotherapy Monitoring Program  [facility]  [phone]

## 2025-03-15 NOTE — PROGRESS NOTES
Virtual Visit Details    Type of service:  Video Visit   Originating Location (pt. Location):  Home    Distant Location (provider location):  [facility]  Platform used for Video Visit:  Joey    ---------------------------------------------------------------------------------------------------------------------------    FOLLOW UP VISIT  -  TELEMEDICINE       Reason for Visit:  Pelvic recurrence post-RP, received salvage XRT plus ADT/abiraterone (500 mg). Treatment completed.     History of Present Illness:   Mr. Yang is a 64-year-old man with a history of prostate cancer as well as esophageal adenocarcinoma (8/2013).  5/1/2018: Prostate biopsy: adenocarcinoma [place] 4+4=8.  PSA was 12.9 ng/mL.  9/1/2022: Prostatectomy (pT3a N0 R0), pathologic evaluation demonstrated [place] 4+4=8; four lymph nodes were normal.  Persistent PSA elevation post-operatively.  10/6/2022: PSMA-PET demonstrated increased uptake in the prostate bed and bilateral pelvic lymph nodes; no distant mets.   Treated with salvage XRT plus ADT/abiraterone.  Abiraterone dose was reduced to 500 mg.    Genomics ([facility]): TMPRSS2-ERG fusion, CTNNB1 (p.T41A), CDKN1B deletion; MS - stable; TMB 3 muts/Mb; gLOH 6%.     PSA levels  5/1/2018        PSA 12.92  9/29/2022      PSA 13.21  1/2023           START Lupron + Zytiga  2/7/2023        PSA = 6.49  Started Salvage Radiation  3/13/23          PSA = 1.70  3/24/23          Radiation Completed.  07/31/23        PSA <0.10, T undetectable  09/08/23        PSA <0.01  10/26/23        PSA <0.01  Held Lupron in 12/2023. On Zytiga 500 mg.  03/20/24        PSA <0.01  07/18/24        PSA <0.01  10/23/24        PSA <0.01  12/12/24        PSA <0.01  Stopped Zytiga  03/13/25        PSA <0.01    Review of Systems:  Alfonso stopped Zytiga and prednisone three months ago. No rashes or skin changes. 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 He has cut back on his  salt and drinking a lot of water. When he elevates his feet at bedtime which seems to be helpful. No significant energy changes. He has continued hot flashes they fluctuate in intensity. Bowels have been normal. There is no constipation or diarrhea. No urinary changes. No new pains in his body. No recent fevers or chills. No chest pain, shortness of breath, or cough. Mood is stable.  A comprehensive 14-point review of systems was performed and was all negative except for symptoms noted above in HPI.  ECOG=1.  Pain 1/10.     PMHx:  Prostate cancer with pelvic recurrence s/p RP  Esophageal carcinoma (2013)     MEDICATIONS     Lupron 22.5 mg subcutaneous, last dose 7/24/23. Received this at [facility].   Abiraterone (ZYTIGA) 500 mg, Oral, DAILY.  Last dose 12/16/24   PredniSONE (DELTASONE) 5 mg, oral, Once daily  Taper started on 12/16/24.   Aloe LIQD Oral, DAILY   Sildenafil (REVATIO) 20 mg   Vitamin D3 (CHOLECALCIFEROL) 2000 units One tablet, Oral, DAILY      PHYSICAL EXAMINATION  General: Patient appears well. Normal body habitus  HEENT: Normocephalic, atraumatic.  Cardiac: Regular rate and rhythm. No murmurs, rub, or extra heart sounds  Lungs: Clear to auscultation bilaterally  Abdomen: Normal bowel sounds. No tenderness to palpation  Extremities: No pedal/ankle edema  Skin: no rash or dermatitis     PSMA-PET Scan (10/6/2022):  Abnormal radiotracer uptake within bilateral pelvic lymph nodes, compatible with metastatic disease.  Abnormal radiotracer uptake at the site of prostatectomy and prior fluid collection is indeterminate.  This is concerning for residual or recurrent disease, but the differential would include inflammatory findings related to the prior anastomotic leak and associated fluid collection.     PATHOLOGY (9/1/2022):  Prostate, radical prostatectomy:  - Prostatic adenocarcinoma, acinar type, with extensive cribriform growth.  - Histologic grade: [place] score 4+4=8 (grade group 4).  Margins negative.  - Extraprostatic extension identified (nonfocal); no seminal vesicle invasion identified.  - Lymph nodes (pelvic):  none (0/4) involved.  - AJCC staging:  pT3a pN0 pR0.        ASSESSMENT AND PLAN  #Prostate cancer with pelvic alena recurrence s/p RP, received salvage XRT plus ADT/abiraterone  - Recommended ADT plus abiraterone for 2 years. Patient had increased arthralgias, headaches, and worsened mood on 1000 mg abiraterone. He has been holding since 06/17/23.  Three month ago, he stopped abiraterone and prednisone.  - His PSA is undetectable at <0.10 ng/mL with suppressed testosterone. We reviewed that this is the expected effect of ADT and abiraterone.  - Reviewed with local metastatic disease in the pelvis he is a candidate for both ADT plus abiraterone without bone metastasis per the STAMPEDE trial. Per that trial, men with locally advanced or metastatic prostate cancer who received ADT plus abiraterone had significantly higher rates of overall and failure-free survival than those who received ADT alone.  - Our plan had been to continue abiraterone until December 2024.  I instructed him to stop Zytiga on 12/16/2024.  - He had previously decided that he would like to stop his Lupron for QoL reasons, and thus he omitted his 12/2023 dose.  - In December 2024, he stopped all systemic therapy. We discussed an appropriate prednisone taper.  - He took prednisone 5 mg every other day for 30 days, then prednisone 5 mg every third day for 30 more days.   - PSA and testosterone will be measured every 3 months moving forward.  We will do a video visit in June 2025.     #Erectile dysfunction  - Has tried Sildenafil and Trimix. Discussed I refer patients to PCP or urology to help with management.  - Would like to establish care with urology at [place].     #Mood  - Continue Wellbutrin per PCP.  - Continue antidepressant therapy.      A total of 40 minutes were spent on this patient on the date of the  encounter conducting chart review, review of test results, interpretation of tests, patient visit, documentation and discussion with other provider(s).  The patient was given the opportunity to ask multiple questions today, all of which were answered to his satisfaction.     Ulices Swan M.D.

## 2025-03-16 ENCOUNTER — HEALTH MAINTENANCE LETTER (OUTPATIENT)
Age: 65
End: 2025-03-16

## 2025-03-17 ENCOUNTER — VIRTUAL VISIT (OUTPATIENT)
Dept: ONCOLOGY | Facility: CLINIC | Age: 65
End: 2025-03-17
Attending: INTERNAL MEDICINE
Payer: COMMERCIAL

## 2025-03-17 DIAGNOSIS — C61 MALIGNANT NEOPLASM OF PROSTATE (H): Primary | ICD-10-CM

## 2025-03-17 NOTE — LETTER
3/17/2025      Davion Yang  [address] [facility] [address]      Dear Colleague,    Thank you for referring your patient, Davion Yang, to the [facility] CANCER [facility]. Please see a copy of my visit note below.      Virtual Visit Details    Type of service:  Video Visit   Originating Location (pt. Location):  Home    Distant Location (provider location):  [facility]  Platform used for Video Visit:  AmWell    ---------------------------------------------------------------------------------------------------------------------------    FOLLOW UP VISIT  -  TELEMEDICINE       Reason for Visit:  Pelvic recurrence post-RP, received salvage XRT plus ADT/abiraterone (500 mg). Treatment completed.     History of Present Illness:   Mr. Yang is a 64-year-old man with a history of prostate cancer as well as esophageal adenocarcinoma (8/2013).  5/1/2018: Prostate biopsy: adenocarcinoma [place] 4+4=8.  PSA was 12.9 ng/mL.  9/1/2022: Prostatectomy (pT3a N0 R0), pathologic evaluation demonstrated Jules 4+4=8; four lymph nodes were normal.  Persistent PSA elevation post-operatively.  10/6/2022: PSMA-PET demonstrated increased uptake in the prostate bed and bilateral pelvic lymph nodes; no distant mets.   Treated with salvage XRT plus ADT/abiraterone.  Abiraterone dose was reduced to 500 mg.    Genomics ([facility]): TMPRSS2-ERG fusion, CTNNB1 (p.T41A), CDKN1B deletion; MS - stable; TMB 3 muts/Mb; gLOH 6%.     PSA levels  5/1/2018        PSA 12.92  9/29/2022      PSA 13.21  1/2023           START Lupron + Zytiga  2/7/2023        PSA = 6.49  Started Salvage Radiation  3/13/23          PSA = 1.70  3/24/23          Radiation Completed.  07/31/23        PSA <0.10, T undetectable  09/08/23        PSA <0.01  10/26/23        PSA <0.01  Held Lupron in 12/2023. On Zytiga 500 mg.  03/20/24        PSA <0.01  07/18/24        PSA <0.01  10/23/24        PSA <0.01  12/12/24        PSA <0.01   Stopped Zytiga  03/13/25        PSA <0.01    Review of Systems:  Alfonso stopped Zytiga and prednisone three months ago. No rashes or skin changes. 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 He has cut back on his salt and drinking a lot of water. When he elevates his feet at bedtime which seems to be helpful. No significant energy changes. He has continued hot flashes they fluctuate in intensity. Bowels have been normal. There is no constipation or diarrhea. No urinary changes. No new pains in his body. No recent fevers or chills. No chest pain, shortness of breath, or cough. Mood is stable.  A comprehensive 14-point review of systems was performed and was all negative except for symptoms noted above in HPI.  ECOG=1.  Pain 1/10.     PMHx:  Prostate cancer with pelvic recurrence s/p RP  Esophageal carcinoma (2013)     MEDICATIONS      Lupron 22.5 mg subcutaneous, last dose 7/24/23. Received this at [facility].    Abiraterone (ZYTIGA) 500 mg, Oral, DAILY.  Last dose 12/16/24    PredniSONE (DELTASONE) 5 mg, oral, Once daily  Taper started on 12/16/24.    Aloe LIQD Oral, DAILY    Sildenafil (REVATIO) 20 mg    Vitamin D3 (CHOLECALCIFEROL) 2000 units One tablet, Oral, DAILY      PHYSICAL EXAMINATION  General: Patient appears well. Normal body habitus  HEENT: Normocephalic, atraumatic.  Cardiac: Regular rate and rhythm. No murmurs, rub, or extra heart sounds  Lungs: Clear to auscultation bilaterally  Abdomen: Normal bowel sounds. No tenderness to palpation  Extremities: No pedal/ankle edema  Skin: no rash or dermatitis     PSMA-PET Scan (10/6/2022):  Abnormal radiotracer uptake within bilateral pelvic lymph nodes, compatible with metastatic disease.  Abnormal radiotracer uptake at the site of prostatectomy and prior fluid collection is indeterminate.  This is concerning for residual or recurrent disease, but the differential would include inflammatory findings related to the  prior anastomotic leak and associated fluid collection.     PATHOLOGY (9/1/2022):  Prostate, radical prostatectomy:  - Prostatic adenocarcinoma, acinar type, with extensive cribriform growth.  - Histologic grade: Jules score 4+4=8 (grade group 4). Margins negative.  - Extraprostatic extension identified (nonfocal); no seminal vesicle invasion identified.  - Lymph nodes (pelvic):  none (0/4) involved.  - AJCC staging:  pT3a pN0 pR0.        ASSESSMENT AND PLAN  #Prostate cancer with pelvic alena recurrence s/p RP, received salvage XRT plus ADT/abiraterone  - Recommended ADT plus abiraterone for 2 years. Patient had increased arthralgias, headaches, and worsened mood on 1000 mg abiraterone. He has been holding since 06/17/23.  Three month ago, he stopped abiraterone and prednisone.  - His PSA is undetectable at <0.10 ng/mL with suppressed testosterone. We reviewed that this is the expected effect of ADT and abiraterone.  - Reviewed with local metastatic disease in the pelvis he is a candidate for both ADT plus abiraterone without bone metastasis per the STAMPEDE trial. Per that trial, men with locally advanced or metastatic prostate cancer who received ADT plus abiraterone had significantly higher rates of overall and failure-free survival than those who received ADT alone.  - Our plan had been to continue abiraterone until December 2024.  I instructed him to stop Zytiga on 12/16/2024.  - He had previously decided that he would like to stop his Lupron for QoL reasons, and thus he omitted his 12/2023 dose.  - In December 2024, he stopped all systemic therapy. We discussed an appropriate prednisone taper.  - He took prednisone 5 mg every other day for 30 days, then prednisone 5 mg every third day for 30 more days.   - PSA and testosterone will be measured every 3 months moving forward.  We will do a video visit in June 2025.     #Erectile dysfunction  - Has tried Sildenafil and Trimix. Discussed I refer patients to PCP  or urology to help with management.  - Would like to establish care with urology at [place].     #Mood  - Continue Wellbutrin per PCP.  - Continue antidepressant therapy.      A total of 40 minutes were spent on this patient on the date of the encounter conducting chart review, review of test results, interpretation of tests, patient visit, documentation and discussion with other provider(s).  The patient was given the opportunity to ask multiple questions today, all of which were answered to his satisfaction.     Ulices Swan M.D.      Again, thank you for allowing me to participate in the care of your patient.        Sincerely,        Ulices Swan MD    Electronically signed

## 2025-03-17 NOTE — NURSING NOTE
Current patient location: [address] [place] [id number]    Is the patient currently in the state of [place]? YES    Visit mode: VIDEO    If the visit is dropped, the patient can be reconnected by:VIDEO VISIT: Send to e-mail at: [email or web address]    Will anyone else be joining the visit? NO  (If patient encounters technical issues they should call [phone]    Are changes needed to the allergy or medication list? Yes Pt states he is no longer is taking abiraterone or prednisone anymore.    Are refills needed on medications prescribed by this physician? NO    Rooming Documentation:  Questionnaire(s) completed    Reason for visit: RECHECK    Marcos KAN

## 2025-03-20 ENCOUNTER — TELEPHONE (OUTPATIENT)
Dept: ONCOLOGY | Facility: CLINIC | Age: 65
End: 2025-03-20
Payer: COMMERCIAL

## 2025-03-20 NOTE — PROGRESS NOTES
CLINICAL NUTRITION SERVICES- ONCOLOGY DISTRESS SCREENING     Identified Concern and Score From Distress Screenin. How concerned are you about your ability to eat? :  0  2. How concerned are you about unintended weight loss or your current weight? : 8     Date of Distress Screening: 3/17     Findings: Phone call with Alfonso. Reports that his appetite is good right now. No nutrition questions or concerns at this time.      Follow-up Required: As needed.     Kailyn Wiley RD, LD  [phone]

## 2025-03-24 ENCOUNTER — PATIENT OUTREACH (OUTPATIENT)
Dept: CARE COORDINATION | Facility: CLINIC | Age: 65
End: 2025-03-24
Payer: COMMERCIAL

## 2025-03-24 NOTE — PROGRESS NOTES
Social Work - Distress Screen Intervention  [facility]    Identified Concern and Score from Distress Screenin. How concerned are you about your ability to eat? 0     2. How concerned are you about unintended weight loss or your current weight? (!) 8     3. How concerned are you about feeling depressed or very sad?  7     4. How concerned are you about feeling anxious or very scared?  2     5. Do you struggle with the loss of meaning and vasu in your life?  Somewhat     6. How concerned are you about work and home life issues that may be affected by your cancer?  (!) 8     7. How concerned are you about knowing what resources are available to help you?  0     8. Do you currently have what you would describe as Restorationist or spiritual struggles? Not at all     9. If you want to be contacted by one of our professionals, I can send a message to them right now.  No data recorded     Date of Distress Screen: 2025  Data: At time of last visit, patient scored positive on distress screening.  outreached to patient today to follow up on elevated distress and introduce psychosocial services and support.  Intervention/Education provided:  contacted patient by phone to discuss distress screening results.  Introduced self and role. Patient feels things are going well for right now and SW provided patient with contact information for any follow up needs.   Follow-up Required:  will remain available to patient for support as needed    MARY Tomlin, JL  Clinical , Adult Oncology  [facility]   [address] [place] [id number]  [email or web address]  Office Phone: [phone]  Support Groups at [facility]: Social Work Services for Cancer Patients ([email or web address])

## 2025-06-23 ENCOUNTER — VIRTUAL VISIT (OUTPATIENT)
Dept: ONCOLOGY | Facility: CLINIC | Age: 65
End: 2025-06-23
Attending: INTERNAL MEDICINE
Payer: COMMERCIAL

## 2025-06-23 ENCOUNTER — PATIENT OUTREACH (OUTPATIENT)
Dept: ONCOLOGY | Facility: CLINIC | Age: 65
End: 2025-06-23
Payer: COMMERCIAL

## 2025-06-23 DIAGNOSIS — C61 MALIGNANT NEOPLASM OF PROSTATE (H): Primary | ICD-10-CM

## 2025-06-23 NOTE — PROGRESS NOTES
[facility]: Cancer Care                                                                                      Attempted to reach patient on his mobile phone, but it rang without a way to leave a message.      Spoke with patient's wife on the home line.  She was unsure if the labs were done, but will reach out to patient and get back to me.    RNCC reached out to [facility] medical records and was told they will look and fax any recent lab results.    Patient called scheduling at reported he did not get labs drawn.  He plans to reschedule the appointment.     Radha Elizabeth, RN, BSN, OCN  Oncology RN Care Coordinator  [facility]

## 2025-06-23 NOTE — LETTER
6/23/2025      Davion Yang  [address] [facility] [address]      Dear Colleague,    Thank you for referring your patient, Davion Yang, to the [facility] CANCER CLINIC. Please see a copy of my visit note below.       No Show.    Again, thank you for allowing me to participate in the care of your patient.        Sincerely,        Ulices wSan MD    Electronically signed

## 2025-06-28 NOTE — PROGRESS NOTES
Virtual Visit Details    Type of service:  Video Visit   Originating Location (pt. Location):  Home    Distant Location (provider location):  [facility]  Platform used for Video Visit:  Joey    ---------------------------------------------------------------------------------------------------------------------------    FOLLOW UP VISIT  -  TELEMEDICINE         Reason for Visit:  Pelvic recurrence post-RP, received salvage XRT plus ADT/abiraterone. Treatment completed.     History of Present Illness:   Mr. Yang is a 64-year-old man with a history of prostate cancer as well as esophageal adenocarcinoma (8/2013).  5/1/2018: Prostate biopsy: adenocarcinoma [place] 4+4=8.  PSA was 12.9 ng/mL.  9/1/2022: Prostatectomy (pT3a N0 R0), pathologic evaluation demonstrated [place] 4+4=8; four lymph nodes were normal.  Had a persistently elevated PSA level post-operatively.  10/6/2022: PSMA-PET demonstrated increased uptake in the prostate bed and bilateral pelvic lymph nodes; no distant mets.   Treated with salvage XRT plus ADT/abiraterone.  Abiraterone dose was stopped in 12/2024.    Genomics ([facility]): TMPRSS2-ERG fusion, CTNNB1 (p.T41A), CDKN1B deletion; BALA; TMB 3 muts/Mb; gLOH 6%.     PSA levels  5/1/2018        PSA 12.92  9/29/2022      PSA 13.21  01/2023         START Lupron + Zytiga  2/7/2023        PSA = 6.49  Started Salvage Radiation  3/13/23          PSA = 1.70  3/24/23          Radiation Completed.  07/31/23        PSA <0.10, T undetectable  09/08/23        PSA <0.01  10/26/23        PSA <0.01  Held Lupron in 12/2023. On Zytiga 500 mg.  03/20/24        PSA <0.01  07/18/24        PSA <0.01  10/23/24        PSA <0.01  12/12/24        PSA <0.01  Stopped Zytiga  03/13/25        PSA <0.01  06/26/25        PSA <0.01    Review of Systems:  Alfonso stopped Zytiga and prednisone six months ago. No rashes or skin changes. 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  He has cut back on his salt and drinking a lot of water. When he elevates his feet at bedtime which seems to be helpful. No significant energy changes. He has continued hot flashes they fluctuate in intensity. Bowels have been normal. There is no constipation or diarrhea. No urinary changes. No new pains in his body. No recent fevers or chills. No chest pain, shortness of breath, or cough. Mood is stable.  A comprehensive 14-point review of systems was performed and was all negative except for symptoms noted above in HPI.  ECOG=1.  Pain 1/10.     PMHx:  Prostate cancer with pelvic recurrence s/p RP  Esophageal carcinoma (2013)  Depression  GERD     MEDICATIONS     Lupron 22.5 mg subcutaneous, last dose 7/24/23. Received this at [facility].   Abiraterone (ZYTIGA) 500 mg, Oral, DAILY.  Last dose 12/16/24   PredniSONE (DELTASONE) 5 mg, oral, Once daily  Taper started on 12/16/24.   Aloe LIQD Oral, DAILY   Sildenafil (REVATIO) 20 mg   Omeprazole (PRILOSEC) 30 mg   Bupropion (WELLBUTRIN) 300 mg   Duloxetine (CYMBALTA) 60 mg   Vitamin D3 (CHOLECALCIFEROL) 2000 units One tablet, Oral, DAILY      PHYSICAL EXAMINATION  General: Patient appears well. Normal body habitus  HEENT: Normocephalic, atraumatic.  Cardiac: Regular rate and rhythm. No murmurs, rub, or extra heart sounds  Lungs: Clear to auscultation bilaterally  Abdomen: Normal bowel sounds. No tenderness to palpation  Extremities: No pedal/ankle edema  Skin: no rash or dermatitis     PSMA-PET Scan (10/6/2022):  Abnormal radiotracer uptake within bilateral pelvic lymph nodes, compatible with metastatic disease.  Abnormal radiotracer uptake at the site of prostatectomy and prior fluid collection is indeterminate.  This is concerning for residual or recurrent disease, but the differential would include inflammatory findings related to the prior anastomotic leak and associated fluid collection.     PATHOLOGY (9/1/2022):  Prostate, radical prostatectomy:  -  Prostatic adenocarcinoma, acinar type, with extensive cribriform growth.  - Histologic grade: Jules score 4+4=8 (grade group 4). Margins negative.  - Extraprostatic extension identified (nonfocal); no seminal vesicle invasion identified.  - Lymph nodes (pelvic):  none (0/4) involved.  - AJCC staging:  pT3a pN0 pR0.    PATHOLOGY (6/26/2025):  His CBC is within normal limits.  His comprehensive metabolic panel reveals a creatinine level of 1.18 mg/dL, but is otherwise unremarkable.  His PSA level is <0.01 ng/mL.        ASSESSMENT AND PLAN  #Prostate cancer with pelvic alena recurrence s/p RP, received salvage XRT plus ADT/abiraterone  - Recommended ADT plus abiraterone for 2 years. Patient had increased arthralgias, headaches, and worsened mood on 1000 mg abiraterone. He has been holding since 06/17/23.  Six month ago, he stopped abiraterone and prednisone.  - His PSA is undetectable at <0.10 ng/mL with suppressed testosterone. We reviewed that this is the expected effect of ADT and abiraterone.  - Reviewed with local metastatic disease in the pelvis he is a candidate for both ADT plus abiraterone without bone metastasis per the STAMPEDE trial. Per that trial, men with locally advanced or metastatic prostate cancer who received ADT plus abiraterone had significantly higher rates of overall and failure-free survival than those who received ADT alone.  - Our plan had been to continue abiraterone until December 2024.  I instructed him to stop Zytiga on 12/16/2024.  - He had previously decided that he would like to stop his Lupron for QoL reasons, and thus he omitted his 12/2023 dose.  - In December 2024, he stopped all systemic therapy. We discussed an appropriate prednisone taper.  - PSA and testosterone will be measured every 3 months moving forward.  We will do a video visit in early October 2025.     #Erectile dysfunction  - Has tried Sildenafil and Trimix. Discussed I refer patients to PCP or urology to help with  management.  - Would like to establish care with urology at [place].     #Mood/depression  - Continue Wellbutrin per PCP.  - Continue antidepressant therapy.      A total of 40 minutes were spent on this patient on the date of the encounter conducting chart review, review of test results, interpretation of tests, patient visit, documentation and discussion with other provider(s).  The patient was given the opportunity to ask multiple questions today, all of which were answered to his satisfaction.     Ulices Swan M.D.

## 2025-06-30 ENCOUNTER — VIRTUAL VISIT (OUTPATIENT)
Dept: ONCOLOGY | Facility: CLINIC | Age: 65
End: 2025-06-30
Attending: INTERNAL MEDICINE
Payer: COMMERCIAL

## 2025-06-30 VITALS — HEIGHT: 70 IN | BODY MASS INDEX: 29.35 KG/M2 | WEIGHT: 205 LBS

## 2025-06-30 DIAGNOSIS — C61 MALIGNANT NEOPLASM OF PROSTATE (H): Primary | ICD-10-CM

## 2025-06-30 PROCEDURE — 98007 SYNCH AUDIO-VIDEO EST HI 40: CPT | Performed by: INTERNAL MEDICINE

## 2025-06-30 RX ORDER — OMEPRAZOLE 20 MG/1
20 CAPSULE, DELAYED RELEASE ORAL
COMMUNITY
Start: 2024-07-24

## 2025-06-30 ASSESSMENT — PAIN SCALES - GENERAL: PAINLEVEL_OUTOF10: NO PAIN (0)

## 2025-06-30 NOTE — NURSING NOTE
Current patient location: [address] [place] [id number]    Is the patient currently in the state of [place]? YES    Visit mode: VIDEO    If the visit is dropped, the patient can be reconnected by:VIDEO VISIT: Send to e-mail at: [email or web address]    Will anyone else be joining the visit? NO  (If patient encounters technical issues they should call [phone]    Are changes needed to the allergy or medication list? Pt stated no changes to allergies and Pt stated no med changes    Are refills needed on medications prescribed by this physician? NO    Rooming Documentation:  Questionnaire(s) completed    Reason for visit: OLEKSANDR KAN

## 2025-06-30 NOTE — LETTER
6/30/2025      Davion Yang  [address] [facility] [facility] [address]      Dear Colleague,    Thank you for referring your patient, Davion Yang, to the [facility]. Please see a copy of my visit note below.      Virtual Visit Details    Type of service:  Video Visit   Originating Location (pt. Location):  Home    Distant Location (provider location):  [facility]  Platform used for Video Visit:  AmWell    ---------------------------------------------------------------------------------------------------------------------------    FOLLOW UP VISIT  -  TELEMEDICINE         Reason for Visit:  Pelvic recurrence post-RP, received salvage XRT plus ADT/abiraterone. Treatment completed.     History of Present Illness:   Mr. Yang is a 64-year-old man with a history of prostate cancer as well as esophageal adenocarcinoma (8/2013).  5/1/2018: Prostate biopsy: adenocarcinoma [place] 4+4=8.  PSA was 12.9 ng/mL.  9/1/2022: Prostatectomy (pT3a N0 R0), pathologic evaluation demonstrated [place] 4+4=8; four lymph nodes were normal.  Had a persistently elevated PSA level post-operatively.  10/6/2022: PSMA-PET demonstrated increased uptake in the prostate bed and bilateral pelvic lymph nodes; no distant mets.   Treated with salvage XRT plus ADT/abiraterone.  Abiraterone dose was stopped in 12/2024.    Genomics ([facility]): TMPRSS2-ERG fusion, CTNNB1 (p.T41A), CDKN1B deletion; BALA; TMB 3 muts/Mb; gLOH 6%.     PSA levels  5/1/2018        PSA 12.92  9/29/2022      PSA 13.21  01/2023         START Lupron + Zytiga  2/7/2023        PSA = 6.49  Started Salvage Radiation  3/13/23          PSA = 1.70  3/24/23          Radiation Completed.  07/31/23        PSA <0.10, T undetectable  09/08/23        PSA <0.01  10/26/23        PSA <0.01  Held Lupron in 12/2023. On Zytiga 500 mg.  03/20/24        PSA <0.01  07/18/24        PSA <0.01  10/23/24        PSA <0.01  12/12/24        PSA <0.01   Stopped Zytiga  03/13/25        PSA <0.01  06/26/25        PSA <0.01    Review of Systems:  Alfonso stopped Zytiga and prednisone six months ago. No rashes or skin changes. 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 He has cut back on his salt and drinking a lot of water. When he elevates his feet at bedtime which seems to be helpful. No significant energy changes. He has continued hot flashes they fluctuate in intensity. Bowels have been normal. There is no constipation or diarrhea. No urinary changes. No new pains in his body. No recent fevers or chills. No chest pain, shortness of breath, or cough. Mood is stable.  A comprehensive 14-point review of systems was performed and was all negative except for symptoms noted above in HPI.  ECOG=1.  Pain 1/10.     PMHx:  Prostate cancer with pelvic recurrence s/p RP  Esophageal carcinoma (2013)  Depression  GERD     MEDICATIONS      Lupron 22.5 mg subcutaneous, last dose 7/24/23. Received this at [facility].    Abiraterone (ZYTIGA) 500 mg, Oral, DAILY.  Last dose 12/16/24    PredniSONE (DELTASONE) 5 mg, oral, Once daily  Taper started on 12/16/24.    Aloe LIQD Oral, DAILY    Sildenafil (REVATIO) 20 mg    Omeprazole (PRILOSEC) 30 mg    Bupropion (WELLBUTRIN) 300 mg    Duloxetine (CYMBALTA) 60 mg    Vitamin D3 (CHOLECALCIFEROL) 2000 units One tablet, Oral, DAILY      PHYSICAL EXAMINATION  General: Patient appears well. Normal body habitus  HEENT: Normocephalic, atraumatic.  Cardiac: Regular rate and rhythm. No murmurs, rub, or extra heart sounds  Lungs: Clear to auscultation bilaterally  Abdomen: Normal bowel sounds. No tenderness to palpation  Extremities: No pedal/ankle edema  Skin: no rash or dermatitis     PSMA-PET Scan (10/6/2022):  Abnormal radiotracer uptake within bilateral pelvic lymph nodes, compatible with metastatic disease.  Abnormal radiotracer uptake at the site of prostatectomy and prior fluid collection is  indeterminate.  This is concerning for residual or recurrent disease, but the differential would include inflammatory findings related to the prior anastomotic leak and associated fluid collection.     PATHOLOGY (9/1/2022):  Prostate, radical prostatectomy:  - Prostatic adenocarcinoma, acinar type, with extensive cribriform growth.  - Histologic grade: [place] score 4+4=8 (grade group 4). Margins negative.  - Extraprostatic extension identified (nonfocal); no seminal vesicle invasion identified.  - Lymph nodes (pelvic):  none (0/4) involved.  - AJCC staging:  pT3a pN0 pR0.    PATHOLOGY (6/26/2025):  His CBC is within normal limits.  His comprehensive metabolic panel reveals a creatinine level of 1.18 mg/dL, but is otherwise unremarkable.  His PSA level is <0.01 ng/mL.        ASSESSMENT AND PLAN  #Prostate cancer with pelvic alena recurrence s/p RP, received salvage XRT plus ADT/abiraterone  - Recommended ADT plus abiraterone for 2 years. Patient had increased arthralgias, headaches, and worsened mood on 1000 mg abiraterone. He has been holding since 06/17/23.  Six month ago, he stopped abiraterone and prednisone.  - His PSA is undetectable at <0.10 ng/mL with suppressed testosterone. We reviewed that this is the expected effect of ADT and abiraterone.  - Reviewed with local metastatic disease in the pelvis he is a candidate for both ADT plus abiraterone without bone metastasis per the STAMPEDE trial. Per that trial, men with locally advanced or metastatic prostate cancer who received ADT plus abiraterone had significantly higher rates of overall and failure-free survival than those who received ADT alone.  - Our plan had been to continue abiraterone until December 2024.  I instructed him to stop Zytiga on 12/16/2024.  - He had previously decided that he would like to stop his Lupron for QoL reasons, and thus he omitted his 12/2023 dose.  - In December 2024, he stopped all systemic therapy. We discussed an  appropriate prednisone taper.  - PSA and testosterone will be measured every 3 months moving forward.  We will do a video visit in early October 2025.     #Erectile dysfunction  - Has tried Sildenafil and Trimix. Discussed I refer patients to PCP or urology to help with management.  - Would like to establish care with urology at [place].     #Mood/depression  - Continue Wellbutrin per PCP.  - Continue antidepressant therapy.      A total of 40 minutes were spent on this patient on the date of the encounter conducting chart review, review of test results, interpretation of tests, patient visit, documentation and discussion with other provider(s).  The patient was given the opportunity to ask multiple questions today, all of which were answered to his satisfaction.     Ulices Swan M.D.      Again, thank you for allowing me to participate in the care of your patient.        Sincerely,        Ulices Swan MD    Electronically signed